# Patient Record
Sex: MALE | Race: WHITE | Employment: OTHER | URBAN - METROPOLITAN AREA
[De-identification: names, ages, dates, MRNs, and addresses within clinical notes are randomized per-mention and may not be internally consistent; named-entity substitution may affect disease eponyms.]

---

## 2019-09-24 ENCOUNTER — TRANSFERRED RECORDS (OUTPATIENT)
Dept: FAMILY MEDICINE | Facility: CLINIC | Age: 69
End: 2019-09-24

## 2019-09-24 LAB — PSA SERPL-MCNC: 3.05 NG/ML (ref 0.13–4)

## 2019-09-27 ENCOUNTER — HEALTH MAINTENANCE LETTER (OUTPATIENT)
Age: 69
End: 2019-09-27

## 2020-03-15 ENCOUNTER — HEALTH MAINTENANCE LETTER (OUTPATIENT)
Age: 70
End: 2020-03-15

## 2020-10-14 ENCOUNTER — OFFICE VISIT (OUTPATIENT)
Dept: FAMILY MEDICINE | Facility: CLINIC | Age: 70
End: 2020-10-14

## 2020-10-14 VITALS
RESPIRATION RATE: 20 BRPM | BODY MASS INDEX: 29.06 KG/M2 | DIASTOLIC BLOOD PRESSURE: 82 MMHG | HEART RATE: 60 BPM | WEIGHT: 203 LBS | HEIGHT: 70 IN | SYSTOLIC BLOOD PRESSURE: 138 MMHG | TEMPERATURE: 97.3 F

## 2020-10-14 DIAGNOSIS — K21.9 GASTROESOPHAGEAL REFLUX DISEASE WITHOUT ESOPHAGITIS: ICD-10-CM

## 2020-10-14 DIAGNOSIS — J45.40 MODERATE PERSISTENT ASTHMA, UNSPECIFIED WHETHER COMPLICATED: Primary | ICD-10-CM

## 2020-10-14 PROCEDURE — 99213 OFFICE O/P EST LOW 20 MIN: CPT | Performed by: FAMILY MEDICINE

## 2020-10-14 RX ORDER — SILDENAFIL 100 MG/1
100 TABLET, FILM COATED ORAL DAILY PRN
COMMUNITY
Start: 2020-10-14 | End: 2020-12-28

## 2020-10-14 RX ORDER — LORATADINE 10 MG/1
10 TABLET ORAL DAILY
COMMUNITY
Start: 2020-10-14

## 2020-10-14 RX ORDER — ALBUTEROL SULFATE 90 UG/1
1-2 AEROSOL, METERED RESPIRATORY (INHALATION) EVERY 6 HOURS PRN
COMMUNITY
Start: 2020-10-14 | End: 2022-03-18

## 2020-10-14 ASSESSMENT — ASTHMA QUESTIONNAIRES
ACUTE_EXACERBATION_TODAY: NO
QUESTION_1 LAST FOUR WEEKS HOW MUCH OF THE TIME DID YOUR ASTHMA KEEP YOU FROM GETTING AS MUCH DONE AT WORK, SCHOOL OR AT HOME: NONE OF THE TIME
ACT_TOTALSCORE: 23
QUESTION_4 LAST FOUR WEEKS HOW OFTEN HAVE YOU USED YOUR RESCUE INHALER OR NEBULIZER MEDICATION (SUCH AS ALBUTEROL): ONCE A WEEK OR LESS
QUESTION_2 LAST FOUR WEEKS HOW OFTEN HAVE YOU HAD SHORTNESS OF BREATH: NOT AT ALL
QUESTION_5 LAST FOUR WEEKS HOW WOULD YOU RATE YOUR ASTHMA CONTROL: WELL CONTROLLED
QUESTION_3 LAST FOUR WEEKS HOW OFTEN DID YOUR ASTHMA SYMPTOMS (WHEEZING, COUGHING, SHORTNESS OF BREATH, CHEST TIGHTNESS OR PAIN) WAKE YOU UP AT NIGHT OR EARLIER THAN USUAL IN THE MORNING: NOT AT ALL

## 2020-10-14 ASSESSMENT — MIFFLIN-ST. JEOR: SCORE: 1687.05

## 2020-10-14 NOTE — PROGRESS NOTES
"Subjective     Charlie Chacko is a 70 year old male who presents to clinic today for the following health issues:    HPI         Asthma Follow-Up    Was ACT completed today?  No      Do you have a cough?  No    Are you experiencing any wheezing in your chest?  No    Do you have any shortness of breath?  No     How often are you using a short-acting (rescue) inhaler or nebulizer, such as Albuterol?  A few times a month    How many days per week do you miss taking your asthma controller medication?  0    Please describe any recent triggers for your asthma: None    Have you had any Emergency Room Visits, Urgent Care Visits, or Hospital Admissions since your last office visit?  No    Doing well on omeprazole for reflux    How many servings of fruits and vegetables do you eat daily?  2-3    On average, how many sweetened beverages do you drink each day (Examples: soda, juice, sweet tea, etc.  Do NOT count diet or artificially sweetened beverages)?   1    How many days per week do you exercise enough to make your heart beat faster? 5    How many minutes a day do you exercise enough to make your heart beat faster? 20 - 29    How many days per week do you miss taking your medication? 0        Review of Systems   Constitutional, HEENT, cardiovascular, pulmonary, gi and gu systems are negative, except as otherwise noted.      Objective    /82   Pulse 60   Temp 97.3  F (36.3  C)   Resp 20   Ht 1.778 m (5' 10\")   Wt 92.1 kg (203 lb)   BMI 29.13 kg/m    Body mass index is 29.13 kg/m .  Physical Exam   GENERAL: healthy, alert and no distress  EYES: Eyes grossly normal to inspection, PERRL and conjunctivae and sclerae normal  HENT: ear canals and TM's normal, nose and mouth without ulcers or lesions  NECK: no adenopathy, no asymmetry, masses, or scars and thyroid normal to palpation  RESP: lungs clear to auscultation - no rales, rhonchi or wheezes  CV: regular rate and rhythm, normal S1 S2, no S3 or S4, no murmur, " click or rub, no peripheral edema and peripheral pulses strong  ABDOMEN: soft, nontender, no hepatosplenomegaly, no masses and bowel sounds normal  MS: no gross musculoskeletal defects noted, no edema            (J45.40) Moderate persistent asthma, unspecified whether complicated  (primary encounter diagnosis)  Comment: doing very well  Plan: BREO ELLIPTA 100-25 MCG/INH inhaler,         DISCONTINUED: BREO ELLIPTA 100-25 MCG/INH         inhaler            (K21.9) Gastroesophageal reflux disease without esophagitis  Comment: well controlled  Plan: omeprazole (PRILOSEC) 20 MG DR capsule

## 2020-10-14 NOTE — NURSING NOTE
Charlie Chacko is here for a medication check and refill.    Questioned patient about current smoking habits.  Pt. has never smoked.  PULSE regular  My Chart: active  CLASSIFICATION OF OVERWEIGHT AND OBESITY BY BMI                        Obesity Class           BMI(kg/m2)  Underweight                                    < 18.5  Normal                                         18.5-24.9  Overweight                                     25.0-29.9  OBESITY                     I                  30.0-34.9                             II                 35.0-39.9  EXTREME OBESITY             III                >40                            Patient's  BMI Body mass index is 29.13 kg/m .  http://hin.nhlbi.nih.gov/menuplanner/menu.cgi  Pre-visit planning  Immunizations - up to date  Colonoscopy - need record, 2017 in Care Everywhere  Mammogram -   Asthma -   PHQ9 -    NYASIA-7 -

## 2020-10-14 NOTE — LETTER
[facility]  [address]  [address]  [facility] [address]  [phone]      October 14, 2020      Charlie Chacko  [id number] [place] [facility] [id number]      EMERGENCY CARE PLAN  Presenting Problem Treatment Plan   Questions or concerns during clinic hours I will call the clinic directly:    [facility]  [address] , [address]  [place] [address]  [phone]   Questions or concerns outside clinic hours  I will call the 24 hour line at [phone]   Patient needs to schedule an appointment  I will call the  scheduling line at [phone]   Same day treatment   I will call the clinic first, then  urgent care and/or  express care if needed   Clinic Care Coordinators Xochilt Drake RN:  [phone]  [facility] Clinical Support Staff: [phone]    Crisis Services:  Behavioral or Mental Health [facility] ([facility])   [phone]   Emergency treatment--Immediately CALL 555

## 2020-10-15 ASSESSMENT — ASTHMA QUESTIONNAIRES: ACT_TOTALSCORE: 23

## 2020-11-09 ENCOUNTER — TRANSFERRED RECORDS (OUTPATIENT)
Dept: FAMILY MEDICINE | Facility: CLINIC | Age: 70
End: 2020-11-09

## 2020-12-28 DIAGNOSIS — N52.9 ERECTILE DYSFUNCTION, UNSPECIFIED ERECTILE DYSFUNCTION TYPE: Primary | ICD-10-CM

## 2020-12-28 RX ORDER — SILDENAFIL 100 MG/1
100 TABLET, FILM COATED ORAL DAILY PRN
Qty: 30 TABLET | Refills: 0 | Status: SHIPPED | OUTPATIENT
Start: 2020-12-28 | End: 2021-06-28

## 2020-12-28 NOTE — TELEPHONE ENCOUNTER
Charlie is requesting a refill of sildenafil.  He is requesting a quantity of 30 rather than qty of 10 because of cost. Pt states he takes 2 per week.  Pt last seen in-clinic on 10/14/2020.  Routing to Dr. Haney.      Pending Prescriptions:                       Disp   Refills    sildenafil (VIAGRA) 100 MG tablet         30 tab*0            Sig: Take 1 tablet (100 mg) by mouth daily as needed           (For ED)

## 2021-01-09 ENCOUNTER — HEALTH MAINTENANCE LETTER (OUTPATIENT)
Age: 71
End: 2021-01-09

## 2021-01-25 ENCOUNTER — TRANSFERRED RECORDS (OUTPATIENT)
Dept: FAMILY MEDICINE | Facility: CLINIC | Age: 71
End: 2021-01-25

## 2021-05-08 ENCOUNTER — HEALTH MAINTENANCE LETTER (OUTPATIENT)
Age: 71
End: 2021-05-08

## 2021-06-28 ENCOUNTER — OFFICE VISIT (OUTPATIENT)
Dept: FAMILY MEDICINE | Facility: CLINIC | Age: 71
End: 2021-06-28

## 2021-06-28 VITALS
BODY MASS INDEX: 28.49 KG/M2 | OXYGEN SATURATION: 94 % | DIASTOLIC BLOOD PRESSURE: 76 MMHG | HEIGHT: 70 IN | HEART RATE: 87 BPM | WEIGHT: 199 LBS | TEMPERATURE: 98 F | SYSTOLIC BLOOD PRESSURE: 112 MMHG

## 2021-06-28 DIAGNOSIS — Z12.12 SCREENING FOR COLORECTAL CANCER: ICD-10-CM

## 2021-06-28 DIAGNOSIS — F41.9 ANXIETY: ICD-10-CM

## 2021-06-28 DIAGNOSIS — Z12.11 SCREENING FOR COLORECTAL CANCER: ICD-10-CM

## 2021-06-28 DIAGNOSIS — Z00.01 ENCOUNTER FOR GENERAL ADULT MEDICAL EXAMINATION WITH ABNORMAL FINDINGS: Primary | ICD-10-CM

## 2021-06-28 DIAGNOSIS — N52.9 ERECTILE DYSFUNCTION, UNSPECIFIED ERECTILE DYSFUNCTION TYPE: ICD-10-CM

## 2021-06-28 DIAGNOSIS — F41.1 GENERALIZED ANXIETY DISORDER: ICD-10-CM

## 2021-06-28 LAB
BUN SERPL-MCNC: 7.5 MG/DL (ref 7–25)
BUN/CREATININE RATIO: 12.1 (ref 6–22)
CALCIUM SERPL-MCNC: 9.8 MG/DL (ref 8.6–10.3)
CHLORIDE SERPLBLD-SCNC: 101.2 MMOL/L (ref 98–110)
CHOLEST SERPL-MCNC: 213 MG/DL (ref 0–199)
CHOLEST/HDLC SERPL: 3 {RATIO} (ref 0–5)
CO2 SERPL-SCNC: 28 MMOL/L (ref 20–32)
CREAT SERPL-MCNC: 1.32 MG/DL (ref 0.6–1.3)
GFR SERPL CREATININE-BSD FRML MDRD: 54 ML/MIN/1.73M2
GLUCOSE SERPL-MCNC: 106 MG/DL (ref 60–99)
HDLC SERPL-MCNC: 66 MG/DL (ref 40–150)
LDLC SERPL CALC-MCNC: 122 MG/DL (ref 0–130)
POTASSIUM SERPL-SCNC: 4.9 MMOL/L (ref 3.5–5.3)
SODIUM SERPL-SCNC: 140.4 MMOL/L (ref 135–146)
TRIGL SERPL-MCNC: 124 MG/DL (ref 0–149)

## 2021-06-28 PROCEDURE — 99213 OFFICE O/P EST LOW 20 MIN: CPT | Mod: 25 | Performed by: STUDENT IN AN ORGANIZED HEALTH CARE EDUCATION/TRAINING PROGRAM

## 2021-06-28 PROCEDURE — G0402 INITIAL PREVENTIVE EXAM: HCPCS | Performed by: STUDENT IN AN ORGANIZED HEALTH CARE EDUCATION/TRAINING PROGRAM

## 2021-06-28 PROCEDURE — 84443 ASSAY THYROID STIM HORMONE: CPT | Mod: 90 | Performed by: STUDENT IN AN ORGANIZED HEALTH CARE EDUCATION/TRAINING PROGRAM

## 2021-06-28 PROCEDURE — 80048 BASIC METABOLIC PNL TOTAL CA: CPT | Performed by: STUDENT IN AN ORGANIZED HEALTH CARE EDUCATION/TRAINING PROGRAM

## 2021-06-28 PROCEDURE — 80061 LIPID PANEL: CPT | Performed by: STUDENT IN AN ORGANIZED HEALTH CARE EDUCATION/TRAINING PROGRAM

## 2021-06-28 PROCEDURE — 36415 COLL VENOUS BLD VENIPUNCTURE: CPT | Performed by: STUDENT IN AN ORGANIZED HEALTH CARE EDUCATION/TRAINING PROGRAM

## 2021-06-28 RX ORDER — ESCITALOPRAM OXALATE 10 MG/1
10 TABLET ORAL DAILY
Qty: 90 TABLET | Refills: 0 | Status: SHIPPED | OUTPATIENT
Start: 2021-06-28 | End: 2021-09-17

## 2021-06-28 RX ORDER — ALPRAZOLAM 0.25 MG
0.25 TABLET ORAL DAILY PRN
Qty: 15 TABLET | Refills: 0 | Status: SHIPPED | OUTPATIENT
Start: 2021-06-28

## 2021-06-28 RX ORDER — SILDENAFIL 100 MG/1
100 TABLET, FILM COATED ORAL DAILY PRN
Qty: 30 TABLET | Refills: 0 | Status: SHIPPED | OUTPATIENT
Start: 2021-06-28 | End: 2021-09-17

## 2021-06-28 SDOH — HEALTH STABILITY: MENTAL HEALTH: HOW MANY STANDARD DRINKS CONTAINING ALCOHOL DO YOU HAVE ON A TYPICAL DAY?: 1 OR 2

## 2021-06-28 SDOH — HEALTH STABILITY: MENTAL HEALTH: HOW OFTEN DO YOU HAVE A DRINK CONTAINING ALCOHOL?: 2-4 TIMES A MONTH

## 2021-06-28 SDOH — HEALTH STABILITY: MENTAL HEALTH: HOW OFTEN DO YOU HAVE 6 OR MORE DRINKS ON ONE OCCASION?: NOT ASKED

## 2021-06-28 ASSESSMENT — PATIENT HEALTH QUESTIONNAIRE - PHQ9
5. POOR APPETITE OR OVEREATING: MORE THAN HALF THE DAYS
SUM OF ALL RESPONSES TO PHQ QUESTIONS 1-9: 3

## 2021-06-28 ASSESSMENT — ANXIETY QUESTIONNAIRES
3. WORRYING TOO MUCH ABOUT DIFFERENT THINGS: MORE THAN HALF THE DAYS
6. BECOMING EASILY ANNOYED OR IRRITABLE: NOT AT ALL
5. BEING SO RESTLESS THAT IT IS HARD TO SIT STILL: NOT AT ALL
2. NOT BEING ABLE TO STOP OR CONTROL WORRYING: MORE THAN HALF THE DAYS
7. FEELING AFRAID AS IF SOMETHING AWFUL MIGHT HAPPEN: NOT AT ALL
GAD7 TOTAL SCORE: 8
IF YOU CHECKED OFF ANY PROBLEMS ON THIS QUESTIONNAIRE, HOW DIFFICULT HAVE THESE PROBLEMS MADE IT FOR YOU TO DO YOUR WORK, TAKE CARE OF THINGS AT HOME, OR GET ALONG WITH OTHER PEOPLE: SOMEWHAT DIFFICULT
1. FEELING NERVOUS, ANXIOUS, OR ON EDGE: MORE THAN HALF THE DAYS

## 2021-06-28 ASSESSMENT — MIFFLIN-ST. JEOR: SCORE: 1655.97

## 2021-06-28 NOTE — PROGRESS NOTES
SUBJECTIVE:   Charlie Chacko is a 71 year old male who presents for Preventive Visit.    Patient has been advised of split billing requirements and indicates understanding: Yes  Are you in the first 12 months of your Medicare Part B coverage?  No    Physical Health:    In general, how would you rate your overall physical health? good    If you drink alcohol do you typically have >3 drinks per day or >7 drinks per week? No    Do you have any problems taking medications regularly?  No    Do you have any side effects from medications? none, mild flushing with sildenafil    Needs assistance for the following daily activities: no assistance needed    Which of the following safety concerns are present in your home?  none identified     Hearing impairment: Yes, has hearing aids    Mental Health:    In general, how would you rate your overall mental or emotional health? fair  PHQ-2 Score:      Do you feel safe in your environment? Yes    Additional concerns to address?  YES    Do you have sleep apnea, excessive snoring or daytime drowsiness?: no    Anxiety Follow-Up    How are you doing with your anxiety since your last visit? Worsened over past 2 yrs. Stress more overwhelming. Sometimes difficulty sleeping.     Are you having other symptoms that might be associated with anxiety? Yes:  worsening ED despite viagra    Have you had a significant life event? No     Are you feeling depressed? Yes:  sometimes feels anhedonia    Do you have any concerns with your use of alcohol or other drugs? No    Has taken xanax very intermittently (30 tab rx has lasted for >10 yrs). Also did see psychologist back in the 80s, prefers pharmacologic options.     Social History     Tobacco Use     Smoking status: Never Smoker     Smokeless tobacco: Never Used   Substance Use Topics     Alcohol use: Yes     Alcohol/week: 0.8 standard drinks     Types: 1 Glasses of wine per week     Frequency: 2-4 times a month     Drinks per session: 1 or 2      Drug use: No     NYASIA-7 SCORE 6/28/2021   Total Score 8     PHQ 6/28/2021   PHQ-9 Total Score 3   Q9: Thoughts of better off dead/self-harm past 2 weeks Not at all     Last PHQ-9 6/28/2021   1.  Little interest or pleasure in doing things 1   2.  Feeling down, depressed, or hopeless 0   3.  Trouble falling or staying asleep, or sleeping too much 1   4.  Feeling tired or having little energy 1   5.  Poor appetite or overeating 0   6.  Feeling bad about yourself 0   7.  Trouble concentrating 0   8.  Moving slowly or restless 0   Q9: Thoughts of better off dead/self-harm past 2 weeks 0   PHQ-9 Total Score 3   Difficulty at work, home, or with people Not difficult at all     NYASIA-7  6/28/2021   1. Feeling nervous, anxious, or on edge 2   2. Not being able to stop or control worrying 2   3. Worrying too much about different things 2   4. Trouble relaxing 2   5. Being so restless that it is hard to sit still 0   6. Becoming easily annoyed or irritable 0   7. Feeling afraid, as if something awful might happen 0   NYASIA-7 Total Score 8   If you checked any problems, how difficult have they made it for you to do your work, take care of things at home, or get along with other people? Somewhat difficult         Reviewed and updated as needed this visit by clinical staff   Allergies  Meds              Reviewed and updated as needed this visit by Provider                Social History     Tobacco Use     Smoking status: Never Smoker     Smokeless tobacco: Never Used   Substance Use Topics     Alcohol use: Yes     Alcohol/week: 0.8 standard drinks     Types: 1 Glasses of wine per week     Frequency: 2-4 times a month     Drinks per session: 1 or 2                           Current providers sharing in care for this patient include:   Patient Care Team:  Vivien Nguyen MD as PCP - General (Family Medicine)  Aramis Haney MD as Assigned PCP    The following health maintenance items are reviewed in Epic and correct as of  today:  Health Maintenance   Topic Date Due     HEPATITIS C SCREENING  Never done     ASTHMA ACTION PLAN  09/12/2009     LIPID  02/08/2015     AORTIC ANEURYSM SCREENING (SYSTEM ASSIGNED)  Never done     COLORECTAL CANCER SCREENING  07/27/2020     ASTHMA CONTROL TEST  04/14/2021     DTAP/TDAP/TD IMMUNIZATION (3 - Td) 09/28/2021 (Originally 3/3/2020)     FALL RISK ASSESSMENT  10/14/2021     MEDICARE ANNUAL WELLNESS VISIT  06/28/2022     PSA  09/24/2022     ADVANCE CARE PLANNING  06/28/2026     PHQ-2  Completed     INFLUENZA VACCINE  Completed     Pneumococcal Vaccine: 65+ Years  Completed     ZOSTER IMMUNIZATION  Completed     COVID-19 Vaccine  Completed     IPV IMMUNIZATION  Aged Out     MENINGITIS IMMUNIZATION  Aged Out     HEPATITIS B IMMUNIZATION  Aged Out     Current Outpatient Medications   Medication Sig Dispense Refill     albuterol (PROAIR HFA/PROVENTIL HFA/VENTOLIN HFA) 108 (90 Base) MCG/ACT inhaler Inhale 1-2 puffs into the lungs every 6 hours as needed for shortness of breath / dyspnea or wheezing       ALPRAZolam (XANAX) 0.25 MG tablet Take 1 tablet (0.25 mg) by mouth daily as needed for anxiety 15 tablet 0     aspirin 81 MG tablet Take 1 tablet by mouth daily.       BREO ELLIPTA 100-25 MCG/INH inhaler Inhale 1 puff into the lungs every other day 1 Inhaler 6     CALCIUM 500 MG OR TABS 1 daily       EPI E-Z PEN ELLA 1:1000  IJ 1 TIME ONLY 1 prn     escitalopram (LEXAPRO) 10 MG tablet Take 1 tablet (10 mg) by mouth daily 90 tablet 0     loratadine (CLARITIN) 10 MG tablet Take 1 tablet (10 mg) by mouth daily       Multiple Vitamins-Minerals (CENTRUM SILVER) per tablet Take 1 tablet by mouth daily.       omeprazole (PRILOSEC) 20 MG DR capsule Take 1 capsule (20 mg) by mouth daily 90 capsule 3     sildenafil (VIAGRA) 100 MG tablet Take 1 tablet (100 mg) by mouth daily as needed (For ED) 30 tablet 0     BENADRYL 25 MG OR TABS 1 TABLET EVERY 4 TO 6 HOURS AS NEEDED 0 0     FISH OIL 1200 MG OR CAPS 1 daily 0 0  "    ROS:  Constitutional, HEENT, cardiovascular, pulmonary, GI, , musculoskeletal, neuro, skin, endocrine and psych systems are negative, except as otherwise noted.    OBJECTIVE:   /76 (BP Location: Left arm, Patient Position: Sitting, Cuff Size: Adult Large)   Pulse 87   Temp 98  F (36.7  C) (Oral)   Ht 1.765 m (5' 9.5\")   Wt 90.3 kg (199 lb)   SpO2 94%   BMI 28.97 kg/m   Estimated body mass index is 28.97 kg/m  as calculated from the following:    Height as of this encounter: 1.765 m (5' 9.5\").    Weight as of this encounter: 90.3 kg (199 lb).  EXAM:   GENERAL: healthy, alert and no distress  EYES: Eyes grossly normal to inspection, PERRL and conjunctivae and sclerae normal  HENT: ear canals and TM's normal, nose and mouth without ulcers or lesions  NECK: no adenopathy, no asymmetry, masses, or scars and thyroid normal to palpation  RESP: lungs clear to auscultation - no rales, rhonchi or wheezes  CV: regular rate and rhythm, normal S1 S2, no murmur, click or rub, no peripheral edema and peripheral pulses strong  ABDOMEN: soft, nontender, no hepatosplenomegaly, no masses and bowel sounds normal  MS: no gross musculoskeletal defects noted, no edema  SKIN: no suspicious lesions or rashes  NEURO: Normal strength and tone, mentation intact and speech normal  PSYCH: mentation appears normal, affect normal/bright    Diagnostic Test Results:  Labs reviewed in Epic    ASSESSMENT / PLAN:       ICD-10-CM    1. Encounter for general adult medical examination with abnormal findings  Z00.01    2. Anxiety  F41.9 ALPRAZolam (XANAX) 0.25 MG tablet     TSH WITH FREE T4 REFLEX ([facility])   3. Erectile dysfunction, unspecified erectile dysfunction type  N52.9 sildenafil (VIAGRA) 100 MG tablet     Lipid Panel (BFP)     Basic Metabolic Panel (BFP)   4. Screening for colorectal cancer  Z12.11 GASTROENTEROLOGY ADULT REF PROCEDURE ONLY    Z12.12    5. Generalized anxiety disorder  F41.1 escitalopram (LEXAPRO) 10 MG tablet " "      Patient has been advised of split billing requirements and indicates understanding: Yes    COUNSELING:  Reviewed preventive health counseling, as reflected in patient instructions       Regular exercise       Healthy diet/nutrition       Vision screening       Hearing screening       Dental care       Colon cancer screening       Prostate cancer screening    Estimated body mass index is 28.97 kg/m  as calculated from the following:    Height as of this encounter: 1.765 m (5' 9.5\").    Weight as of this encounter: 90.3 kg (199 lb).        He reports that he has never smoked. He has never used smokeless tobacco.    Appropriate preventive services were discussed with this patient, including applicable screening as appropriate for cardiovascular disease, diabetes, osteopenia/osteoporosis, and glaucoma.  As appropriate for age/gender, discussed screening for colorectal cancer, prostate cancer, breast cancer, and cervical cancer. Checklist reviewing preventive services available has been given to the patient.    Additional issues addressed today:  1.  Anxiety: Longstanding history of anxiety symptoms worsening over the past 2 years, consistent with generalized anxiety disorder.  Discussed the nature of this condition and available treatment options. Will check TSH with labs today.  Patient elects to begin SSRI, follow-up in 4-6 weeks or sooner as needed.  Small refill of alprazolam sent, but discussed risks and reasons it is not a long-term management option.  2.  Erectile dysfunction: Viagra seemingly less effective recently, discussed alternatives but agreed to continue monitoring symptoms while we work to improve anxiety.  Suspect strong psychologic component.    Patient in agreement this plan, all questions answered.      Masood Liriano MD  [facility]  "

## 2021-06-28 NOTE — PATIENT INSTRUCTIONS
Blood work here today     Start Lexapro 1/2 pill daily for one week, then increase to 1 pill daily    Schedule colonoscopy at your convience    Follow up with me in 4-6 weeks to reassess anxiety, sooner if needed      Patient Education   Personalized Prevention Plan  You are due for the preventive services outlined below.  Your care team is available to assist you in scheduling these services.  If you have already completed any of these items, please share that information with your care team to update in your medical record.  Health Maintenance Due   Topic Date Due     Hepatitis C Screening  Never done     Asthma Action Plan - yearly  09/12/2009     Cholesterol Lab  02/08/2015     Annual Wellness Visit  02/21/2015     AORTIC ANEURYSM SCREENING (SYSTEM ASSIGNED)  Never done     Colorectal Cancer Screening  07/27/2020     PHQ-2  Never done     Asthma Control Test  04/14/2021

## 2021-06-28 NOTE — NURSING NOTE
Chief Complaint   Patient presents with     Physical     fasting     Pre-Visit Screening:  Immunizations: TDAP due today  Colonoscopy: Overdue - order placed  Mammogram: NA  Asthma Action Test/Plan: given today  PHQ9: given today  GAD7: given today  Questioned patient about current smoking habits Pt. never  OK to leave a detailed message on voice mail for today's visit yes, phone # [phone]

## 2021-06-29 LAB — TSH SERPL-ACNC: 1.54 MIU/L (ref 0.4–4.5)

## 2021-06-29 ASSESSMENT — ASTHMA QUESTIONNAIRES: ACT_TOTALSCORE: 22

## 2021-06-29 ASSESSMENT — ANXIETY QUESTIONNAIRES: GAD7 TOTAL SCORE: 8

## 2021-07-02 DIAGNOSIS — R79.89 ELEVATED SERUM CREATININE: Primary | ICD-10-CM

## 2021-08-30 DIAGNOSIS — K21.9 GASTROESOPHAGEAL REFLUX DISEASE WITHOUT ESOPHAGITIS: ICD-10-CM

## 2021-08-30 NOTE — TELEPHONE ENCOUNTER
Charlie Chacko is requesting a refill of:    Pending Prescriptions:                       Disp   Refills    omeprazole (PRILOSEC) 20 MG DR capsule    90 cap*3            Sig: Take 1 capsule (20 mg) by mouth daily    Please close encounter if RX was sent. Thanks, Emerald

## 2021-09-17 ENCOUNTER — OFFICE VISIT (OUTPATIENT)
Dept: FAMILY MEDICINE | Facility: CLINIC | Age: 71
End: 2021-09-17

## 2021-09-17 VITALS
SYSTOLIC BLOOD PRESSURE: 124 MMHG | BODY MASS INDEX: 29.26 KG/M2 | OXYGEN SATURATION: 94 % | DIASTOLIC BLOOD PRESSURE: 80 MMHG | WEIGHT: 201 LBS | RESPIRATION RATE: 22 BRPM | HEART RATE: 66 BPM

## 2021-09-17 DIAGNOSIS — R79.89 ELEVATED SERUM CREATININE: ICD-10-CM

## 2021-09-17 DIAGNOSIS — F41.1 GENERALIZED ANXIETY DISORDER: Primary | ICD-10-CM

## 2021-09-17 DIAGNOSIS — N52.9 ERECTILE DYSFUNCTION, UNSPECIFIED ERECTILE DYSFUNCTION TYPE: ICD-10-CM

## 2021-09-17 LAB
BUN SERPL-MCNC: 17 MG/DL (ref 7–25)
BUN/CREATININE RATIO: 13.5 (ref 6–22)
CALCIUM SERPL-MCNC: 9.9 MG/DL (ref 8.6–10.3)
CHLORIDE SERPLBLD-SCNC: 103.4 MMOL/L (ref 98–110)
CO2 SERPL-SCNC: 28.5 MMOL/L (ref 20–32)
CREAT SERPL-MCNC: 1.26 MG/DL (ref 0.6–1.3)
GLUCOSE SERPL-MCNC: 103 MG/DL (ref 60–99)
POTASSIUM SERPL-SCNC: 4.93 MMOL/L (ref 3.5–5.3)
SODIUM SERPL-SCNC: 139.6 MMOL/L (ref 135–146)

## 2021-09-17 PROCEDURE — 99214 OFFICE O/P EST MOD 30 MIN: CPT | Performed by: STUDENT IN AN ORGANIZED HEALTH CARE EDUCATION/TRAINING PROGRAM

## 2021-09-17 PROCEDURE — 80048 BASIC METABOLIC PNL TOTAL CA: CPT | Performed by: STUDENT IN AN ORGANIZED HEALTH CARE EDUCATION/TRAINING PROGRAM

## 2021-09-17 PROCEDURE — 36415 COLL VENOUS BLD VENIPUNCTURE: CPT | Performed by: STUDENT IN AN ORGANIZED HEALTH CARE EDUCATION/TRAINING PROGRAM

## 2021-09-17 RX ORDER — SILDENAFIL 100 MG/1
100 TABLET, FILM COATED ORAL DAILY PRN
Qty: 90 TABLET | Refills: 0 | Status: SHIPPED | OUTPATIENT
Start: 2021-09-17 | End: 2024-08-16

## 2021-09-17 RX ORDER — ESCITALOPRAM OXALATE 10 MG/1
10 TABLET ORAL DAILY
Qty: 90 TABLET | Refills: 1 | Status: SHIPPED | OUTPATIENT
Start: 2021-09-17 | End: 2022-03-18

## 2021-09-17 NOTE — PATIENT INSTRUCTIONS
Blood work here today    Continue current medications    Colonoscopy at your convenience    Flu shot and tetanus at your pharmacy     Follow-up with me in approx 6 months, sooner if needed

## 2021-09-17 NOTE — PROGRESS NOTES
Assessment & Plan     1. Generalized anxiety disorder  Much improved, continue current dose. Refill sent. Follow-up 6 months, sooner prn.   - escitalopram (LEXAPRO) 10 MG tablet; Take 1 tablet (10 mg) by mouth daily  Dispense: 90 tablet; Refill: 1    2. Erectile dysfunction, unspecified erectile dysfunction type  Improved with anxiety treatment, no side effects or CV concerns, cont viagra prn.  - sildenafil (VIAGRA) 100 MG tablet; Take 1 tablet (100 mg) by mouth daily as needed (For ED)  Dispense: 90 tablet; Refill: 0    3. Elevated serum creatinine  Recheck, suspect prev slight elev was dehydration.   - Basic Metabolic Panel (BFP)  - VENOUS COLLECTION       Masood Liriano MD, [facility]       Subjective     Charlie Chacko is a 71 year old male who presents to clinic today for the following health issues:    HPI   Anxiety Follow-Up    How are you doing with your anxiety since your last visit? Improved significantly     Are you having other symptoms that might be associated with anxiety? No    Have you had a significant life event? No Stressors much less    Are you feeling depressed? No    Do you have any concerns with your use of alcohol or other drugs? No    PHQ9 and GAD7 both 0 today    ED follow-up. Much improved with less anxiety/stress as above. Needs viagra refilled.     Social History     Tobacco Use     Smoking status: Never Smoker     Smokeless tobacco: Never Used   Substance Use Topics     Alcohol use: Yes     Alcohol/week: 0.8 standard drinks     Types: 1 Glasses of wine per week     Drug use: No             Objective    /80 (BP Location: Right arm, Patient Position: Sitting, Cuff Size: Adult Regular)   Pulse 66   Resp 22   Wt 91.2 kg (201 lb)   SpO2 94%   BMI 29.26 kg/m    Body mass index is 29.26 kg/m .  Physical Exam   Alert, NAD  NC/AT  Sclerae anicteric  Resp nonlabored  Skin warm and dry  No focal neuro deficits. Speech intact. Normal gait.  Appropriate  affect

## 2021-09-17 NOTE — NURSING NOTE
Chief Complaint   Patient presents with     Recheck Medication     fasting, no concerns     Pre-Visit Screening:  Immunizations: due for high dose flu, TDAP  Colonoscopy:  Mammogram:  Asthma Action Test/Plan:  PHQ9: done today  GAD7: done today  Questioned patient about current smoking habits Pt.  OK to leave a detailed message on voice mail for today's visit YES, phone # [phone]

## 2021-10-23 ENCOUNTER — HEALTH MAINTENANCE LETTER (OUTPATIENT)
Age: 71
End: 2021-10-23

## 2021-11-29 DIAGNOSIS — J45.40 MODERATE PERSISTENT ASTHMA, UNSPECIFIED WHETHER COMPLICATED: ICD-10-CM

## 2021-12-10 ENCOUNTER — IMMUNIZATION (OUTPATIENT)
Dept: FAMILY MEDICINE | Facility: CLINIC | Age: 71
End: 2021-12-10
Payer: COMMERCIAL

## 2021-12-10 DIAGNOSIS — Z23 NEED FOR PROPHYLACTIC VACCINATION AND INOCULATION AGAINST INFLUENZA: Primary | ICD-10-CM

## 2021-12-10 PROCEDURE — G0008 ADMIN INFLUENZA VIRUS VAC: HCPCS

## 2021-12-10 PROCEDURE — 90662 IIV NO PRSV INCREASED AG IM: CPT

## 2022-03-08 DIAGNOSIS — F41.1 GENERALIZED ANXIETY DISORDER: ICD-10-CM

## 2022-03-08 RX ORDER — ESCITALOPRAM OXALATE 10 MG/1
TABLET ORAL
Qty: 90 TABLET | Refills: 1 | COMMUNITY
Start: 2022-03-08

## 2022-03-08 NOTE — TELEPHONE ENCOUNTER
Charlie Chacko is requesting a refill of:    Refused Prescriptions:                       Disp   Refills    escitalopram (LEXAPRO) 10 MG tablet [Pharm*90 tab*1        Sig: TAKE 1 TABLET BY MOUTH EVERY DAY  Refused By: ANDREINA MELENDEZ  Reason for Refusal: Patient needs appointment    Pt last seen 09/17/21 advised to return in 6 months, pt due for non-fasting OV. Sent [employer] message

## 2022-03-18 ENCOUNTER — OFFICE VISIT (OUTPATIENT)
Dept: FAMILY MEDICINE | Facility: CLINIC | Age: 72
End: 2022-03-18

## 2022-03-18 VITALS
WEIGHT: 207 LBS | BODY MASS INDEX: 29.63 KG/M2 | DIASTOLIC BLOOD PRESSURE: 80 MMHG | OXYGEN SATURATION: 95 % | HEIGHT: 70 IN | SYSTOLIC BLOOD PRESSURE: 140 MMHG | HEART RATE: 64 BPM | TEMPERATURE: 97.9 F | RESPIRATION RATE: 22 BRPM

## 2022-03-18 DIAGNOSIS — F41.1 GENERALIZED ANXIETY DISORDER: ICD-10-CM

## 2022-03-18 DIAGNOSIS — J45.40 MODERATE PERSISTENT ASTHMA, UNSPECIFIED WHETHER COMPLICATED: ICD-10-CM

## 2022-03-18 DIAGNOSIS — Z12.11 SPECIAL SCREENING FOR MALIGNANT NEOPLASMS, COLON: Primary | ICD-10-CM

## 2022-03-18 PROCEDURE — 99213 OFFICE O/P EST LOW 20 MIN: CPT | Performed by: STUDENT IN AN ORGANIZED HEALTH CARE EDUCATION/TRAINING PROGRAM

## 2022-03-18 RX ORDER — ESCITALOPRAM OXALATE 10 MG/1
10 TABLET ORAL DAILY
Qty: 90 TABLET | Refills: 1 | Status: SHIPPED | OUTPATIENT
Start: 2022-03-18 | End: 2022-09-26

## 2022-03-18 RX ORDER — ALBUTEROL SULFATE 90 UG/1
1-2 AEROSOL, METERED RESPIRATORY (INHALATION) EVERY 4 HOURS PRN
Qty: 18 G | Refills: 3 | Status: SHIPPED | OUTPATIENT
Start: 2022-03-18 | End: 2023-05-16

## 2022-03-18 ASSESSMENT — ANXIETY QUESTIONNAIRES
1. FEELING NERVOUS, ANXIOUS, OR ON EDGE: NOT AT ALL
5. BEING SO RESTLESS THAT IT IS HARD TO SIT STILL: NOT AT ALL
7. FEELING AFRAID AS IF SOMETHING AWFUL MIGHT HAPPEN: NOT AT ALL
2. NOT BEING ABLE TO STOP OR CONTROL WORRYING: NOT AT ALL
6. BECOMING EASILY ANNOYED OR IRRITABLE: NOT AT ALL
GAD7 TOTAL SCORE: 0
3. WORRYING TOO MUCH ABOUT DIFFERENT THINGS: NOT AT ALL
IF YOU CHECKED OFF ANY PROBLEMS ON THIS QUESTIONNAIRE, HOW DIFFICULT HAVE THESE PROBLEMS MADE IT FOR YOU TO DO YOUR WORK, TAKE CARE OF THINGS AT HOME, OR GET ALONG WITH OTHER PEOPLE: NOT DIFFICULT AT ALL

## 2022-03-18 ASSESSMENT — PATIENT HEALTH QUESTIONNAIRE - PHQ9
5. POOR APPETITE OR OVEREATING: NOT AT ALL
SUM OF ALL RESPONSES TO PHQ QUESTIONS 1-9: 1

## 2022-03-18 ASSESSMENT — ASTHMA QUESTIONNAIRES: ACT_TOTALSCORE: 25

## 2022-03-18 NOTE — PATIENT INSTRUCTIONS
Continue current medications    schdule colonoscopy  [facility]  [address]  [address]  [place] [address]  [phone] - appt line    Follow-up with me in 6 months, sooner if needed

## 2022-03-18 NOTE — NURSING NOTE
Chief Complaint   Patient presents with     Recheck Medication     non fasting medication check and review for Lexapro     Pre-visit Screening:  Immunizations:  up to date  Colonoscopy:  is due and ordered today  Mammogram: NA  Asthma Action Test/Plan:  ACT given today   PHQ9: given today    GAD7: given today   Questioned patient about current smoking habits Pt. has never smoked.  Ok to leave detailed message on voice mail for today's visit only Yes, phone # [phone]

## 2022-03-18 NOTE — PROGRESS NOTES
Assessment & Plan     1. Special screening for malignant neoplasms, colon  Screening due  - Adult Gastro Ref - Procedure Only; Future    2. Generalized anxiety disorder  Doing well, continue current meds, discussed additional resources, follow-up 6 mos or sooner prn  - escitalopram (LEXAPRO) 10 MG tablet; Take 1 tablet (10 mg) by mouth daily  Dispense: 90 tablet; Refill: 1    3. Moderate persistent asthma, unspecified whether complicated  Stable, continue current meds  - BREO ELLIPTA 100-25 MCG/INH inhaler; Inhale 1 puff into the lungs daily  Dispense: 60 each; Refill: 3  - albuterol (PROAIR HFA/PROVENTIL HFA/VENTOLIN HFA) 108 (90 Base) MCG/ACT inhaler; Inhale 1-2 puffs into the lungs every 4 hours as needed for shortness of breath / dyspnea or wheezing  Dispense: 18 g; Refill: 3       Patient Instructions   Continue current medications    schdule colonoscopy  [facility]  [address]  [address]  [place] [address]  [phone] - appt line    Follow-up with me in 6 months, sooner if needed      Masood Liriano MD, [facility]       Subjective     Charlie Chacko is a 72 year old male who presents to clinic today for the following health issues:    HPI   Chief Complaint   Patient presents with     Recheck Medication     non fasting medication check and review for Lexapro      Anxiety Follow-Up    How are you doing with your anxiety since your last visit? Improved     Are you having other symptoms that might be associated with anxiety? No    Have you had a significant life event? No     Are you feeling depressed? No    Do you have any concerns with your use of alcohol or other drugs? No    Social History     Tobacco Use     Smoking status: Never Smoker     Smokeless tobacco: Never Used   Substance Use Topics     Alcohol use: Yes     Alcohol/week: 0.8 standard drinks     Types: 1 Glasses of wine per week     Drug use: No     NYASIA-7 SCORE 6/28/2021 3/18/2022   Total Score 8 0  "    PHQ 6/28/2021 3/18/2022   PHQ-9 Total Score 3 1   Q9: Thoughts of better off dead/self-harm past 2 weeks Not at all Not at all       Asthma Follow-Up    Was ACT completed today?    Yes    ACT Total Scores 3/18/2022   ACT TOTAL SCORE (Goal Greater than or Equal to 20) 25   In the past 12 months, how many times did you visit the emergency room for your asthma without being admitted to the hospital? 0   In the past 12 months, how many times were you hospitalized overnight because of your asthma? 0          How many days per week do you miss taking your asthma controller medication?  Doesn't need every day    Please describe any recent triggers for your asthma: None    Have you had any Emergency Room Visits, Urgent Care Visits, or Hospital Admissions since your last office visit?  No          Objective    BP (!) 140/80 (BP Location: Right arm, Patient Position: Sitting, Cuff Size: Adult Large)   Pulse 64   Temp 97.9  F (36.6  C) (Temporal)   Resp 22   Ht 1.778 m (5' 10\")   Wt 93.9 kg (207 lb)   SpO2 95%   BMI 29.70 kg/m    Body mass index is 29.7 kg/m .  Physical Exam   Alert, NAD  NC/AT  Sclerae anicteric  RRR  Resp nonlabored, ctab  Skin warm and dry  No focal neuro deficits. Speech intact. Normal gait.  Appropriate, bright affect          "

## 2022-03-19 ASSESSMENT — ANXIETY QUESTIONNAIRES: GAD7 TOTAL SCORE: 0

## 2022-05-24 ENCOUNTER — OFFICE VISIT (OUTPATIENT)
Dept: FAMILY MEDICINE | Facility: CLINIC | Age: 72
End: 2022-05-24

## 2022-05-24 VITALS
TEMPERATURE: 98.3 F | BODY MASS INDEX: 29.7 KG/M2 | SYSTOLIC BLOOD PRESSURE: 126 MMHG | OXYGEN SATURATION: 92 % | WEIGHT: 207 LBS | HEART RATE: 80 BPM | RESPIRATION RATE: 22 BRPM

## 2022-05-24 DIAGNOSIS — U07.1 INFECTION DUE TO 2019 NOVEL CORONAVIRUS: Primary | ICD-10-CM

## 2022-05-24 DIAGNOSIS — R09.81 SINUS CONGESTION: ICD-10-CM

## 2022-05-24 DIAGNOSIS — J45.40 MODERATE PERSISTENT ASTHMA, UNSPECIFIED WHETHER COMPLICATED: ICD-10-CM

## 2022-05-24 LAB — COVID-19: POSITIVE

## 2022-05-24 PROCEDURE — 87635 SARS-COV-2 COVID-19 AMP PRB: CPT | Performed by: STUDENT IN AN ORGANIZED HEALTH CARE EDUCATION/TRAINING PROGRAM

## 2022-05-24 PROCEDURE — 99214 OFFICE O/P EST MOD 30 MIN: CPT | Performed by: STUDENT IN AN ORGANIZED HEALTH CARE EDUCATION/TRAINING PROGRAM

## 2022-05-24 PROCEDURE — G2023 SPECIMEN COLLECT COVID-19: HCPCS | Performed by: STUDENT IN AN ORGANIZED HEALTH CARE EDUCATION/TRAINING PROGRAM

## 2022-05-24 NOTE — PROGRESS NOTES
Assessment & Plan       ICD-10-CM    1. Infection due to 2019 novel coronavirus  U07.1 nirmatrelvir and ritonavir (PAXLOVID) therapy pack   2. Sinus congestion  R09.81 COVID-19 (BFP)     nirmatrelvir and ritonavir (PAXLOVID) therapy pack   3. Moderate persistent asthma, unspecified whether complicated  J45.40 nirmatrelvir and ritonavir (PAXLOVID) therapy pack      Discussed r/b/a of paxlovid, pt elects to pursue. GFR borderline so will use reduced dosing. Aware not to take viagra or xanax while taking. Supportive cares and reasons to follow-up or seek emergent care reviewed.        30 minutes spent on the date of the encounter doing chart review, history and exam, documentation and further activities per the note    Masood Liriano MD, [facility]       Subjective     Charlie Chacko is a 72 year old male who presents to clinic today for the following health issues:    HPI   Chief Complaint   Patient presents with     Sinus Problem     Starting three days ago had some sinus pressure and congestion, still having congestion and did have a low grade fever        COVID-19 Symptom Review  How many days ago did these symptoms start? 3 d  Primarily sinus congestion   Are any of the following symptoms significant for you?    New or worsening difficulty breathing? No    Worsening cough? No    Fever or chills? Yes, I felt feverish or had chills.    Headache: no    Sore throat: YES    Chest pain: no    Diarrhea: no    Body aches? no    What treatments has patient tried? Using inhalers   Does patient live in a nursing home, group home, or shelter? no  Does patient have a way to get food/medications during quarantined? Yes, I have a friend or family member who can help me.        Current Outpatient Medications   Medication     albuterol (PROAIR HFA/PROVENTIL HFA/VENTOLIN HFA) 108 (90 Base) MCG/ACT inhaler     ALPRAZolam (XANAX) 0.25 MG tablet     aspirin 81 MG tablet     BENADRYL 25 MG OR TABS     BREO  ELLIPTA 100-25 MCG/INH inhaler     CALCIUM 500 MG OR TABS     EPI E-Z PEN ELLA 1:1000  IJ     escitalopram (LEXAPRO) 10 MG tablet     FISH OIL 1200 MG OR CAPS     loratadine (CLARITIN) 10 MG tablet     Multiple Vitamins-Minerals (CENTRUM SILVER) per tablet     nirmatrelvir and ritonavir (PAXLOVID) therapy pack     omeprazole (PRILOSEC) 20 MG DR capsule     sildenafil (VIAGRA) 100 MG tablet     No current facility-administered medications for this visit.           Objective    BP (!) 126/0 (BP Location: Right arm, Patient Position: Sitting, Cuff Size: Adult Large)   Pulse 80   Temp 98.3  F (36.8  C) (Oral)   Resp 22   Wt 93.9 kg (207 lb)   SpO2 92%   BMI 29.70 kg/m    Body mass index is 29.7 kg/m .  Physical Exam   Alert, NAD  NC/AT  Sclerae anicteric  Regular  Resp nonlabored, clear  Skin warm and dry  No focal neuro deficits. Speech intact. Normal gait.  Appropriate affect    Last Comprehensive Metabolic Panel:  Sodium   Date Value Ref Range Status   09/17/2021 139.6 135 - 146 mmol/L Final     Potassium   Date Value Ref Range Status   09/17/2021 4.93 3.5 - 5.3 mmol/L Final     Chloride   Date Value Ref Range Status   09/17/2021 103.4 98 - 110 mmol/L Final     Carbon Dioxide   Date Value Ref Range Status   09/17/2021 28.5 20 - 32 mmol/L Final     Anion Gap   Date Value Ref Range Status   02/08/2010 8 6 - 17 mmol/L Final     Glucose   Date Value Ref Range Status   09/17/2021 103 (A) 60 - 99 mg/dL Final     Urea Nitrogen   Date Value Ref Range Status   09/17/2021 17 7 - 25 mg/dL Final     BUN/Creatinine Ratio   Date Value Ref Range Status   09/17/2021 13.5 6 - 22 Final     Creatinine   Date Value Ref Range Status   09/17/2021 1.26 0.60 - 1.30 mg/dL Final     GFR Estimate   Date Value Ref Range Status   06/28/2021 54 (A) >60 ml/min/1.73m2 Final     Calcium   Date Value Ref Range Status   09/17/2021 9.9 8.6 - 10.3 mg/dL Final

## 2022-05-24 NOTE — NURSING NOTE
Chief Complaint   Patient presents with     Sinus Problem     Starting three days ago had some sinus pressure and congestion, still having congestion and did have a low grade fever     Pre-visit Screening:  Immunizations:  up to date  Colonoscopy:  Pt will get this scheduled soon  Mammogram: NA  Asthma Action Test/Plan:  NA  PHQ9:  NA  GAD7:  NA  Questioned patient about current smoking habits Pt. has never smoked.  Ok to leave detailed message on voice mail for today's visit only Yes, phone # [phone]

## 2022-05-24 NOTE — PATIENT INSTRUCTIONS
Call pharmacy before going to     [facility]: [phone] (M-F 8a-6p, Sat/Sun 8a-4p)    Dont take viagra or xanax while taking paxlovid      Instructions for Patients  {Choose where to send COVID19 patient based on nurse triage or clinical judgement of symptom severity & add additional information if desired (Optional):[id number]}    What are the symptoms of COVID-19?  Symptoms can include fever, cough, shortness of breath, chills, headache, muscle pain sore throat, fatigue, runny or stuffy nose, and loss of taste and smell. Other less common symptoms include nausea, vomiting, or diarrhea (watery stools).    Know when to call 911. Emergency warning signs include:    Trouble breathing or shortness of breath    Pain or pressure in the chest that doesn't go away    Feeling confused like you haven't felt before, or not being able to wake up    Bluish-colored lips or face    How can I take care of myself?  1. Get lots of rest. Drink extra fluids (unless a doctor has told you not to).  2. Take Tylenol (acetaminophen) for fever or pain. If you have liver or kidney problems, ask your family doctor if it's okay to take Tylenol   Adults:   650 mg (two 325 mg pills or tablets) every 4 to 6 hours, or...   1,000 mg (two 500 mg pills or tablets) every 8 hours as needed.  Note: Don't take more than 3,000 mg in one day. Acetaminophen is found in many medicines (both prescribed and over the counter). Read all labels to be sure you don't take too much.  For children, check the Tylenol bottle for the right dose. The dose is based on the child's age or weight.  3. Take over the counter medicines for your symptoms as needed. Talk to your pharmacist.  4. If you have other health problems (like cancer, heart failure, an organ transplant, or severe kidney disease): Call your specialty clinic if you don't feel better in the next 2 days.    These guidelines are for isolating and quarantining before  returning to work, school or .     For employers, schools and day cares: This is an official notice for this person s medical guidelines for returning in-person.     For health care sites: The CDC gives different isolation and quarantine guidelines for healthcare sites, please check with these sites before arriving.     How do I self-isolate?  You isolate when you have symptoms of COVID or a test shows you have COVID, even if you don t have symptoms.     If you DO have symptoms:  o Stay home and away from others  - For at least 5 days after your symptoms started, AND   - You are fever free for 24 hours (with no medicine that reduces fever), AND  - Your other symptoms are better.  o Wear a mask for 10 full days any time you are around others.    If you DON T have symptoms:  o Stay at home and away from others for at least 5 days after your positive test.  o Wear a mask for 10 full days any time you are around others.    How and when do I quarantine?  You quarantine when you may have been exposed to the virus and DON T have symptoms.     Stay home and away from others.     You must quarantine for 5 days after your last contact with a person who has COVID.  o Note: If you are fully vaccinated, you don t need to quarantine. You should still follow the steps below.     Wear a mask for 10 full days any time you re around others.    Get tested at least 5 days after you were exposed, even if you don t have symptoms.     If you start to have symptoms, isolate right away and get tested.    Where can I get more information?    [facility] COVID-19 Resource Hub: [email or web address]     CDC Quarantine & Isolation: https://www.cdc.gov/coronavirus/2019-ncov/your-health/quarantine-isolation.html     CDC - What to Do If You're Sick: https://www.cdc.gov/coronavirus/2019-ncov/if-you-are-sick/index.html    [facility] clinical trials (COVID-19 research studies):  [email or web address]    [place] Department of Health COVID-19 Public Hotline: 1-314.611.2924

## 2022-07-30 ENCOUNTER — HEALTH MAINTENANCE LETTER (OUTPATIENT)
Age: 72
End: 2022-07-30

## 2022-08-26 ENCOUNTER — OFFICE VISIT (OUTPATIENT)
Dept: FAMILY MEDICINE | Facility: CLINIC | Age: 72
End: 2022-08-26

## 2022-08-26 VITALS
DIASTOLIC BLOOD PRESSURE: 90 MMHG | HEART RATE: 63 BPM | OXYGEN SATURATION: 95 % | BODY MASS INDEX: 30.13 KG/M2 | WEIGHT: 210 LBS | RESPIRATION RATE: 22 BRPM | TEMPERATURE: 98 F | SYSTOLIC BLOOD PRESSURE: 126 MMHG

## 2022-08-26 DIAGNOSIS — R09.81 SINUS CONGESTION: ICD-10-CM

## 2022-08-26 DIAGNOSIS — J45.40 MODERATE PERSISTENT ASTHMA, UNSPECIFIED WHETHER COMPLICATED: ICD-10-CM

## 2022-08-26 DIAGNOSIS — R05.9 COUGH: Primary | ICD-10-CM

## 2022-08-26 LAB — COVID-19: NEGATIVE

## 2022-08-26 PROCEDURE — 99214 OFFICE O/P EST MOD 30 MIN: CPT | Performed by: STUDENT IN AN ORGANIZED HEALTH CARE EDUCATION/TRAINING PROGRAM

## 2022-08-26 PROCEDURE — 87635 SARS-COV-2 COVID-19 AMP PRB: CPT | Performed by: STUDENT IN AN ORGANIZED HEALTH CARE EDUCATION/TRAINING PROGRAM

## 2022-08-26 PROCEDURE — G2023 SPECIMEN COLLECT COVID-19: HCPCS | Performed by: STUDENT IN AN ORGANIZED HEALTH CARE EDUCATION/TRAINING PROGRAM

## 2022-08-26 RX ORDER — PREDNISONE 20 MG/1
40 TABLET ORAL
Qty: 10 TABLET | Refills: 0 | Status: SHIPPED | OUTPATIENT
Start: 2022-08-26 | End: 2022-08-31

## 2022-08-26 RX ORDER — AZITHROMYCIN 250 MG/1
TABLET, FILM COATED ORAL
Qty: 6 TABLET | Refills: 0 | Status: SHIPPED | OUTPATIENT
Start: 2022-08-26 | End: 2022-08-31

## 2022-08-26 ASSESSMENT — ASTHMA QUESTIONNAIRES: ACT_TOTALSCORE: 20

## 2022-08-26 NOTE — PATIENT INSTRUCTIONS
Likely viral infection, glad you're starting to improve    Covid test negative today, consider testing one more time this weekend    Can fill prescriptions if symptoms don't continue to improve by next week    Call or go in if anything significantly worsening over the weekend

## 2022-08-26 NOTE — PROGRESS NOTES
Assessment & Plan       ICD-10-CM    1. Cough  R05.9 COVID-19 (BFP)     azithromycin (ZITHROMAX) 250 MG tablet     predniSONE (DELTASONE) 20 MG tablet   2. Sinus congestion  R09.81    3. Moderate persistent asthma, unspecified whether complicated  J45.40       Covid negative, discussed still likely viral URI. Recommend continued sx mgmt, with prescriptions provided for patient to start 7-10 days from onset if sx don't continue to improve. Reasons to follow-up here or seek emergent care reviewed.    Patient Instructions   Likely viral infection, glad you're starting to improve    Covid test negative today, consider testing one more time this weekend    Can fill prescriptions if symptoms don't continue to improve by next week    Call or go in if anything significantly worsening over the weekend     Masood Liriano MD, [facility]       Subjective     Charlie Chacko is a 72 year old male who presents to clinic today for the following health issues:    HPI   Chief Complaint   Patient presents with     Sinus Problem     Sx began 4 days ago with sinus congestion, low grade fever, dry cough, and mild chest tightness and nocturnal wheezing noted by wife. No CP. Did an at home COVID test and it was negative. Has hx of seasonal allergies. Using mucinex and albuterol. No sick exposures, wife is well. Had covid in May.           Objective    BP (!) 126/90 (BP Location: Right arm, Patient Position: Sitting, Cuff Size: Adult Large)   Pulse 63   Temp 98  F (36.7  C) (Temporal)   Resp 22   Wt 95.3 kg (210 lb)   SpO2 95%   BMI 30.13 kg/m    Body mass index is 30.13 kg/m .  Physical Exam   Alert, NAD  NC/AT   Bilateral TMs wnl  OP clear  Mild sinus congestion bilat  Sclerae anicteric  RRR  Resp nonlabored, CTAB  Skin warm and dry  No focal neuro deficits. Speech intact. Normal gait.  Appropriate affect    Results for orders placed or performed in visit on 08/26/22   COVID-19 (BFP)     Status: None    Result Value Ref Range    COVID-19 Negative

## 2022-08-26 NOTE — NURSING NOTE
Chief Complaint   Patient presents with     Sinus Problem     Having some sinus congestion, feels that lungs are heavy, causing some asthma concerns, symptoms first started three to four days ago now, did an at home COVID test and it was negative, feels that it may be from allergies but wants to make sure, does have a slight cough due to drainage     Pre-visit Screening:  Immunizations:  up to date  Colonoscopy:  is due and to be scheduled by patient for later completion  Mammogram: NA  Asthma Action Test/Plan:  ACT given today   PHQ9:  NA  GAD7:  NA  Questioned patient about current smoking habits Pt. has never smoked.  Ok to leave detailed message on voice mail for today's visit only Yes, phone # [phone]

## 2022-08-31 DIAGNOSIS — K21.9 GASTROESOPHAGEAL REFLUX DISEASE WITHOUT ESOPHAGITIS: ICD-10-CM

## 2022-08-31 DIAGNOSIS — F41.1 GENERALIZED ANXIETY DISORDER: ICD-10-CM

## 2022-08-31 RX ORDER — ESCITALOPRAM OXALATE 10 MG/1
10 TABLET ORAL DAILY
Qty: 90 TABLET | Refills: 1 | COMMUNITY
Start: 2022-08-31

## 2022-08-31 NOTE — TELEPHONE ENCOUNTER
Charlie Chacko is requesting a refill of:    Refused Prescriptions:                       Disp   Refills    omeprazole (PRILOSEC) 20 MG DR capsule [Ph*90 cap*3        Sig: TAKE 1 CAPSULE BY MOUTH EVERY DAY  Refused By: ANDREINA MELENDEZ  Reason for Refusal: Patient needs appointment    escitalopram (LEXAPRO) 10 MG tablet [Pharm*90 tab*1        Sig: TAKE 1 TABLET (10 MG) BY MOUTH DAILY.  Refused By: ANDREINA MELENDEZ  Reason for Refusal: Patient needs appointment    Pt due for OV

## 2022-09-26 ENCOUNTER — OFFICE VISIT (OUTPATIENT)
Dept: FAMILY MEDICINE | Facility: CLINIC | Age: 72
End: 2022-09-26

## 2022-09-26 VITALS
BODY MASS INDEX: 30.35 KG/M2 | OXYGEN SATURATION: 96 % | HEIGHT: 70 IN | DIASTOLIC BLOOD PRESSURE: 82 MMHG | HEART RATE: 62 BPM | WEIGHT: 212 LBS | SYSTOLIC BLOOD PRESSURE: 134 MMHG | TEMPERATURE: 97.2 F

## 2022-09-26 DIAGNOSIS — F41.1 GENERALIZED ANXIETY DISORDER: ICD-10-CM

## 2022-09-26 DIAGNOSIS — K42.9 UMBILICAL HERNIA WITHOUT OBSTRUCTION AND WITHOUT GANGRENE: Primary | ICD-10-CM

## 2022-09-26 DIAGNOSIS — K21.9 GASTROESOPHAGEAL REFLUX DISEASE WITHOUT ESOPHAGITIS: ICD-10-CM

## 2022-09-26 PROCEDURE — 99214 OFFICE O/P EST MOD 30 MIN: CPT | Performed by: STUDENT IN AN ORGANIZED HEALTH CARE EDUCATION/TRAINING PROGRAM

## 2022-09-26 RX ORDER — ESCITALOPRAM OXALATE 10 MG/1
10 TABLET ORAL DAILY
Qty: 90 TABLET | Refills: 3 | Status: SHIPPED | OUTPATIENT
Start: 2022-09-26 | End: 2023-05-16

## 2022-09-26 ASSESSMENT — ANXIETY QUESTIONNAIRES
IF YOU CHECKED OFF ANY PROBLEMS ON THIS QUESTIONNAIRE, HOW DIFFICULT HAVE THESE PROBLEMS MADE IT FOR YOU TO DO YOUR WORK, TAKE CARE OF THINGS AT HOME, OR GET ALONG WITH OTHER PEOPLE: NOT DIFFICULT AT ALL
7. FEELING AFRAID AS IF SOMETHING AWFUL MIGHT HAPPEN: NOT AT ALL
2. NOT BEING ABLE TO STOP OR CONTROL WORRYING: NOT AT ALL
3. WORRYING TOO MUCH ABOUT DIFFERENT THINGS: NOT AT ALL
GAD7 TOTAL SCORE: 0
6. BECOMING EASILY ANNOYED OR IRRITABLE: NOT AT ALL
1. FEELING NERVOUS, ANXIOUS, OR ON EDGE: NOT AT ALL
GAD7 TOTAL SCORE: 0
5. BEING SO RESTLESS THAT IT IS HARD TO SIT STILL: NOT AT ALL

## 2022-09-26 ASSESSMENT — PATIENT HEALTH QUESTIONNAIRE - PHQ9
SUM OF ALL RESPONSES TO PHQ QUESTIONS 1-9: 0
5. POOR APPETITE OR OVEREATING: NOT AT ALL

## 2022-09-26 NOTE — PROGRESS NOTES
Assessment & Plan       ICD-10-CM    1. Umbilical hernia without obstruction and without gangrene  K42.9       2. Generalized anxiety disorder  F41.1 escitalopram (LEXAPRO) 10 MG tablet      3. Gastroesophageal reflux disease without esophagitis  K21.9 omeprazole (PRILOSEC) 20 MG DR capsule         Mental health doing well, no changes today  GERD stable, no changes to meds  Umbilical hernia, chronic, discussed surgical referral and reasons to seek emergent care      Patient Instructions   No change to medications    Recommend Flu and covid shots at your pharmacy    Umbilical hernia: let me know if you want to see a surgeon    Follow-up 6 months, sooner if needed        Masood Liriano MD, [facility]       Subjective     Charlie Chacko is a 72 year old male who presents to clinic today for the following health issues:    HPI   Chief Complaint   Patient presents with     Recheck Medication     Mental health med refill      Anxiety Follow-Up    How are you doing with your anxiety since your last visit? No change doing well    Are you having other symptoms that might be associated with anxiety? No    Have you had a significant life event? No     Are you feeling depressed? No    Do you have any concerns with your use of alcohol or other drugs? No    Social History     Tobacco Use     Smoking status: Never     Smokeless tobacco: Never   Substance Use Topics     Alcohol use: Yes     Alcohol/week: 0.8 standard drinks     Types: 1 Glasses of wine per week     Drug use: No     NYASIA-7 SCORE 6/28/2021 3/18/2022 9/26/2022   Total Score 8 0 0     PHQ 6/28/2021 3/18/2022 9/26/2022   PHQ-9 Total Score 3 1 0   Q9: Thoughts of better off dead/self-harm past 2 weeks Not at all Not at all Not at all     GERD: doing well, controled with meds. No melena.   Belly button: small bump. Not painful. No redness. Stools regular. No GI sx.        Objective    /82 (BP Location: Right arm, Patient Position: Sitting,  "Cuff Size: Adult Large)   Pulse 62   Temp 97.2  F (36.2  C) (Temporal)   Ht 1.778 m (5' 10\")   Wt 96.2 kg (212 lb)   SpO2 96%   BMI 30.42 kg/m    Body mass index is 30.42 kg/m .  Physical Exam   Alert, NAD  NC/AT  Sclerae anicteric  Regular  Resp nonlabored  Abd soft, small easily reducible umb hernia, nontender  Skin warm and dry  No focal neuro deficits. Speech intact. Normal gait.  Appropriate affect    Labs reviewed       "

## 2022-09-26 NOTE — NURSING NOTE
Chief Complaint   Patient presents with     Recheck Medication     Mental health med refill       Pre-visit Screening:  Immunizations:  not up to date - will get high dose flu at a later date  Colonoscopy:  is up to date  Mammogram: NA  Asthma Action Test/Plan:  NA  PHQ9:  Done today  GAD7:  Done today  Questioned patient about current smoking habits Pt. has never smoked.  Ok to leave detailed message on voice mail for today's visit only Yes, phone # [phone]

## 2022-09-26 NOTE — PATIENT INSTRUCTIONS
No change to medications    Recommend Flu and covid shots at your pharmacy    Umbilical hernia: let me know if you want to see a surgeon    Follow-up 6 months, sooner if needed

## 2022-11-26 ENCOUNTER — HEALTH MAINTENANCE LETTER (OUTPATIENT)
Age: 72
End: 2022-11-26

## 2023-05-05 DIAGNOSIS — J45.40 MODERATE PERSISTENT ASTHMA, UNSPECIFIED WHETHER COMPLICATED: ICD-10-CM

## 2023-05-08 RX ORDER — FLUTICASONE FUROATE AND VILANTEROL TRIFENATATE 100; 25 UG/1; UG/1
POWDER RESPIRATORY (INHALATION)
Refills: 3 | COMMUNITY
Start: 2023-05-08

## 2023-05-08 RX ORDER — ALBUTEROL SULFATE 90 UG/1
AEROSOL, METERED RESPIRATORY (INHALATION)
Refills: 3 | COMMUNITY
Start: 2023-05-08

## 2023-05-08 NOTE — TELEPHONE ENCOUNTER
Charlie Chacko is requesting a refill of:    Refused Prescriptions:                       Disp   Refills    VENTOLIN  (90 Base) MCG/ACT inhaler*       3        Sig: INHALE 1-2 PUFFS BY MOUTH EVERY 4 HOURS AS NEEDED FOR           SHORTNESS OF BREATH / DYSPNEA OR WHEEZING  Refused By: ANDREINA MELENDEZ  Reason for Refusal: Patient needs appointment    BREO ELLIPTA 100-25 MCG/ACT inhaler [Pharm*       3        Sig: TAKE 1 PUFF BY MOUTH EVERY DAY  Refused By: ANDREINA MELENDEZ  Reason for Refusal: Patient needs appointment    Pt last seen 09/26/22 advised to return in 6 months. Pt due for OV

## 2023-05-16 ENCOUNTER — OFFICE VISIT (OUTPATIENT)
Dept: FAMILY MEDICINE | Facility: CLINIC | Age: 73
End: 2023-05-16

## 2023-05-16 VITALS
HEART RATE: 66 BPM | SYSTOLIC BLOOD PRESSURE: 136 MMHG | OXYGEN SATURATION: 95 % | BODY MASS INDEX: 30.06 KG/M2 | WEIGHT: 210 LBS | TEMPERATURE: 97.6 F | RESPIRATION RATE: 20 BRPM | DIASTOLIC BLOOD PRESSURE: 88 MMHG | HEIGHT: 70 IN

## 2023-05-16 DIAGNOSIS — Z00.00 ENCOUNTER FOR MEDICARE ANNUAL WELLNESS EXAM: ICD-10-CM

## 2023-05-16 DIAGNOSIS — Z12.11 SPECIAL SCREENING FOR MALIGNANT NEOPLASMS, COLON: ICD-10-CM

## 2023-05-16 DIAGNOSIS — R06.09 EXERTIONAL DYSPNEA: ICD-10-CM

## 2023-05-16 DIAGNOSIS — Z12.5 PROSTATE CANCER SCREENING: ICD-10-CM

## 2023-05-16 DIAGNOSIS — J45.40 MODERATE PERSISTENT ASTHMA, UNSPECIFIED WHETHER COMPLICATED: ICD-10-CM

## 2023-05-16 DIAGNOSIS — Z13.220 LIPID SCREENING: ICD-10-CM

## 2023-05-16 DIAGNOSIS — F41.1 GENERALIZED ANXIETY DISORDER: Primary | ICD-10-CM

## 2023-05-16 LAB
BUN SERPL-MCNC: 10 MG/DL (ref 7–25)
BUN/CREATININE RATIO: 8.5 (ref 6–32)
CALCIUM SERPL-MCNC: 9.1 MG/DL (ref 8.6–10.3)
CHLORIDE SERPLBLD-SCNC: 103.4 MMOL/L (ref 98–110)
CHOLEST SERPL-MCNC: 231 MG/DL (ref 0–199)
CHOLEST/HDLC SERPL: 4 {RATIO} (ref 0–5)
CO2 SERPL-SCNC: 28.7 MMOL/L (ref 20–32)
CREAT SERPL-MCNC: 1.17 MG/DL (ref 0.6–1.3)
GLUCOSE SERPL-MCNC: 96 MG/DL (ref 60–99)
HDLC SERPL-MCNC: 58 MG/DL (ref 40–150)
LDLC SERPL CALC-MCNC: 146 MG/DL (ref 0–130)
POTASSIUM SERPL-SCNC: 4.86 MMOL/L (ref 3.5–5.3)
SODIUM SERPL-SCNC: 138.4 MMOL/L (ref 135–146)
TRIGL SERPL-MCNC: 136 MG/DL (ref 0–149)

## 2023-05-16 PROCEDURE — G0439 PPPS, SUBSEQ VISIT: HCPCS | Performed by: STUDENT IN AN ORGANIZED HEALTH CARE EDUCATION/TRAINING PROGRAM

## 2023-05-16 PROCEDURE — 80061 LIPID PANEL: CPT | Performed by: STUDENT IN AN ORGANIZED HEALTH CARE EDUCATION/TRAINING PROGRAM

## 2023-05-16 PROCEDURE — 80048 BASIC METABOLIC PNL TOTAL CA: CPT | Performed by: STUDENT IN AN ORGANIZED HEALTH CARE EDUCATION/TRAINING PROGRAM

## 2023-05-16 PROCEDURE — 99214 OFFICE O/P EST MOD 30 MIN: CPT | Mod: 25 | Performed by: STUDENT IN AN ORGANIZED HEALTH CARE EDUCATION/TRAINING PROGRAM

## 2023-05-16 PROCEDURE — 36415 COLL VENOUS BLD VENIPUNCTURE: CPT | Performed by: STUDENT IN AN ORGANIZED HEALTH CARE EDUCATION/TRAINING PROGRAM

## 2023-05-16 RX ORDER — ALBUTEROL SULFATE 90 UG/1
1-2 AEROSOL, METERED RESPIRATORY (INHALATION) EVERY 4 HOURS PRN
Qty: 18 G | Refills: 3 | Status: SHIPPED | OUTPATIENT
Start: 2023-05-16 | End: 2024-04-02

## 2023-05-16 RX ORDER — FLUTICASONE FUROATE AND VILANTEROL 100; 25 UG/1; UG/1
1 POWDER RESPIRATORY (INHALATION) DAILY
Qty: 60 EACH | Refills: 5 | Status: SHIPPED | OUTPATIENT
Start: 2023-05-16 | End: 2023-08-08

## 2023-05-16 RX ORDER — ESCITALOPRAM OXALATE 10 MG/1
10 TABLET ORAL DAILY
Qty: 90 TABLET | Refills: 1 | Status: SHIPPED | OUTPATIENT
Start: 2023-05-16 | End: 2023-08-08

## 2023-05-16 ASSESSMENT — ANXIETY QUESTIONNAIRES
7. FEELING AFRAID AS IF SOMETHING AWFUL MIGHT HAPPEN: NOT AT ALL
3. WORRYING TOO MUCH ABOUT DIFFERENT THINGS: SEVERAL DAYS
IF YOU CHECKED OFF ANY PROBLEMS ON THIS QUESTIONNAIRE, HOW DIFFICULT HAVE THESE PROBLEMS MADE IT FOR YOU TO DO YOUR WORK, TAKE CARE OF THINGS AT HOME, OR GET ALONG WITH OTHER PEOPLE: NOT DIFFICULT AT ALL
5. BEING SO RESTLESS THAT IT IS HARD TO SIT STILL: SEVERAL DAYS
2. NOT BEING ABLE TO STOP OR CONTROL WORRYING: SEVERAL DAYS
6. BECOMING EASILY ANNOYED OR IRRITABLE: NOT AT ALL
1. FEELING NERVOUS, ANXIOUS, OR ON EDGE: SEVERAL DAYS
GAD7 TOTAL SCORE: 4
GAD7 TOTAL SCORE: 4

## 2023-05-16 ASSESSMENT — ASTHMA QUESTIONNAIRES: ACT_TOTALSCORE: 18

## 2023-05-16 ASSESSMENT — PATIENT HEALTH QUESTIONNAIRE - PHQ9
SUM OF ALL RESPONSES TO PHQ QUESTIONS 1-9: 2
5. POOR APPETITE OR OVEREATING: NOT AT ALL

## 2023-05-16 NOTE — LETTER
My Asthma Action Plan    Name: Charlie Chacko   YOB: 1950  Date: 5/16/2023   My doctor: TAY SALAZAR MD   [place]        My Control Medicine: Fluticasone furoate + vilanterol (Breo Ellipta)  -  100/25mcg Uses daily  My Rescue Medicine: Albuterol (Proair/Ventolin/Proventil HFA) 2-4 puffs EVERY 4 HOURS as needed. Use a spacer if recommended by your provider.  My Oral Steroid Medicine: No current oral steroids-not needed My Asthma Severity:   Moderate Persistent  Know your asthma triggers: dust mites, pollens, animal dander, mold, humidity, strong odors and fumes, and exercise or sports              GREEN ZONE   Good Control  I feel good  No cough or wheeze  Can work, sleep and play without asthma symptoms       Take your asthma control medicine every day.     If exercise triggers your asthma, take your rescue medication  15 minutes before exercise or sports, and  During exercise if you have asthma symptoms  Spacer to use with inhaler: If you have a spacer, make sure to use it with your inhaler             YELLOW ZONE Getting Worse  I have ANY of these:  I do not feel good  Cough or wheeze  Chest feels tight  Wake up at night   Keep taking your Green Zone medications  Start taking your rescue medicine:  every 20 minutes for up to 1 hour. Then every 4 hours for 24-48 hours.  If you stay in the Yellow Zone for more than 12-24 hours, contact your doctor.  If you do not return to the Green Zone in 12-24 hours or you get worse, start taking your oral steroid medicine if prescribed by your provider.           RED ZONE Medical Alert - Get Help  I have ANY of these:  I feel awful  Medicine is not helping  Breathing getting harder  Trouble walking or talking  Nose opens wide to breathe       Take your rescue medicine NOW  If your provider has prescribed an oral steroid medicine, start taking it NOW  Call your doctor NOW  If you are still in the Red Zone after 20 minutes  and you have not reached your doctor:  Take your rescue medicine again and  Call 911 or go to the emergency room right away    See your regular doctor within 2 weeks of an Emergency Room or Urgent Care visit for follow-up treatment.          Annual Reminders:  Meet with Asthma Educator,  Flu Shot in the Fall, consider Pneumonia Vaccination for patients with asthma (aged 19 and older).    Pharmacy: [facility] [id number] [place] [id number] [address]    Electronically signed by TAY SALAZAR MD   Date: 05/16/23                      Asthma Triggers  How To Control Things That Make Your Asthma Worse    Triggers are things that make your asthma worse.  Look at the list below to help you find your triggers and what you can do about them.  You can help prevent asthma flare-ups by staying away from your triggers.      Trigger                                                          What you can do   Cigarette Smoke  Tobacco smoke can make asthma worse. Do not allow smoking in your home, car or around you.  Be sure no one smokes at a child s day care or school.  If you smoke, ask your health care provider for ways to help you quit.  Ask family members to quit too.  Ask your health care provider for a referral to Quit Plan to help you quit smoking, or call 9-146-864-PLAN.     Colds, Flu, Bronchitis  These are common triggers of asthma. Wash your hands often.  Don t touch your eyes, nose or mouth.  Get a flu shot every year.     Dust Mites  These are tiny bugs that live in cloth or carpet. They are too small to see. Wash sheets and blankets in hot water every week.   Encase pillows and mattress in dust mite proof covers.  Avoid having carpet if you can. If you have carpet, vacuum weekly.   Use a dust mask and HEPA vacuum.   Pollen and Outdoor Mold  Some people are allergic to trees, grass, or weed pollen, or molds. Try to keep your windows closed.  Limit time out doors when pollen count is high.   Ask you health care  provider about taking medicine during allergy season.     Animal Dander  Some people are allergic to skin flakes, urine or saliva from pets with fur or feathers. Keep pets with fur or feathers out of your home.    If you can t keep the pet outdoors, then keep the pet out of your bedroom.  Keep the bedroom door closed.  Keep pets off cloth furniture and away from stuffed toys.     Mice, Rats, and Cockroaches   Some people are allergic to the waste from these pests.   Cover food and garbage.  Clean up spills and food crumbs.  Store grease in the refrigerator.   Keep food out of the bedroom.   Indoor Mold  This can be a trigger if your home has high moisture. Fix leaking faucets, pipes, or other sources of water.   Clean moldy surfaces.  Dehumidify basement if it is damp and smelly.   Smoke, Strong Odors, and Sprays  These can reduce air quality. Stay away from strong odors and sprays, such as perfume, powder, hair spray, paints, smoke incense, paint, cleaning products, candles and new carpet.   Exercise or Sports  Some people with asthma have this trigger. Be active!  Ask your doctor about taking medicine before sports or exercise to prevent symptoms.    Warm up for 5-10 minutes before and after sports or exercise.     Other Triggers of Asthma  Cold air:  Cover your nose and mouth with a scarf.  Sometimes laughing or crying can be a trigger.  Some medicines and food can trigger asthma.

## 2023-05-16 NOTE — PROGRESS NOTES
Assessment & Plan     1. Moderate persistent asthma, unspecified whether complicated  ACT Score 18 today, discussed increasing breo but pt elects to wait for now. Continue allergy tx.   - fluticasone-vilanterol (BREO ELLIPTA) 100-25 MCG/ACT inhaler; Inhale 1 puff into the lungs daily  Dispense: 60 each; Refill: 5  - albuterol (PROAIR HFA/PROVENTIL HFA/VENTOLIN HFA) 108 (90 Base) MCG/ACT inhaler; Inhale 1-2 puffs into the lungs every 4 hours as needed for shortness of breath or wheezing  Dispense: 18 g; Refill: 3    2. Exertional dyspnea  Noted for past year. While could be related to asthma, pt notes this feels different. Prior CT calcium reviewed, recommend Cardiology consult to discuss further testing.   - Adult Cardiology Eval  Referral  - Basic Metabolic Panel (BFP)    3. Lipid screening  Recheck labs   - Lipid Panel (BFP)    4. Generalized anxiety disorder  Trial increase lexapro to 15mg    5. Encounter for Medicare annual wellness exam  See separate note    6. Prostate cancer screening  Recheck labs, discussed r/b/a  - PSA Total ([facility])    7. Special screening for malignant neoplasms, colon  - Colonoscopy Screening  Referral     Patient Instructions   Increase lexapro to 15mg daily for one month    Otherwise continue current medications (can increase breo dose if needed)     Schedule Cardiology appointment: [facility]   Dr. Carmencita Cid  [address]  [address]  [place] [address]  [phone] -- appt line    schdule colonoscopy  [facility]  [address]  [address]  [facility] [id number] [phone] - appt line     Bring copy of healthcare directive when able     Follow-up with me in 3-4 months, sooner if needed      Reasons to follow-up sooner or seek emergent care reviewed.     Masood Liriano MD, [facility]       Subjective     Charlie Chacko is a 73 year old male who presents to clinic today for the following health  issues:    HPI   Chief Complaint   Patient presents with     Recheck Medication     Non fasting medication check and review, needs refills of inhalers, ACT and asthma action plan due, wants to talk about increasing dose of lexapro due to feeling more anxiety then normal lately      Medicare Visit     Annual medicare wellness visit       Asthma Follow-Up  Allergies have been more bothersome this spring. Had been doing breo every other day until a few weeks ago, has been using daily since  Was ACT completed today?  Yes        8/26/2022     2:27 PM   ACT Total Scores   ACT TOTAL SCORE (Goal Greater than or Equal to 20) 20   In the past 12 months, how many times did you visit the emergency room for your asthma without being admitted to the hospital? 0   In the past 12 months, how many times were you hospitalized overnight because of your asthma? 0          How many days per week do you miss taking your asthma controller medication?  0    Please describe any recent triggers for your asthma: upper respiratory infections and seasonal allergies    Have you had any Emergency Room Visits, Urgent Care Visits, or Hospital Admissions since your last office visit?  No    Also reports exertional SOB increasing over past year and feels different from usual asthma sensation.     Anxiety Follow-Up    How are you doing with your anxiety since your last visit? No change, still feels anxious more than he'd like     Are you having other symptoms that might be associated with anxiety? No    Have you had a significant life event? No     Are you feeling depressed? No    Do you have any concerns with your use of alcohol or other drugs? No    Social History     Tobacco Use     Smoking status: Never     Smokeless tobacco: Never   Substance Use Topics     Alcohol use: Yes     Alcohol/week: 0.8 standard drinks of alcohol     Types: 1 Glasses of wine per week     Drug use: No         6/28/2021    10:24 AM 3/18/2022     3:39 PM 9/26/2022     1:51  "PM   NYASIA-7 SCORE   Total Score 8 0 0         6/28/2021    10:24 AM 3/18/2022     3:39 PM 9/26/2022     1:51 PM   PHQ   PHQ-9 Total Score 3 1 0   Q9: Thoughts of better off dead/self-harm past 2 weeks Not at all Not at all Not at all           Objective    /88 (BP Location: Right arm, Patient Position: Sitting, Cuff Size: Adult Large)   Pulse 66   Temp 97.6  F (36.4  C) (Temporal)   Resp 20   Ht 1.778 m (5' 10\")   Wt 95.3 kg (210 lb)   SpO2 95%   BMI 30.13 kg/m    Body mass index is 30.13 kg/m .  Alert, NAD  NC/AT  Sclerae anicteric  RRR, no edema  Resp nonlabored, no wheezes or rhonchi   Skin warm and dry  No focal neuro deficits. Speech intact.   Appropriate affect  Normal gait.      Labs reviewed.          "

## 2023-05-16 NOTE — PROGRESS NOTES
Charlie Chacko is a 73 year old male who presents for Medicare Annual Wellness Visit.    Current providers caring for this patient include:  Patient Care Team:  Masood Liriano MD as PCP - General (Family Medicine)  Aramis Haney MD as Assigned PCP    Complete Medical and Social history reviewed with patient, outlined below.    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)     ACP (advance care planning)     Intermittent asthma     GERD (gastroesophageal reflux disease)     Hyperlipidemia     HYPERLIPIDEMIA LDL GOAL <160     Health Care Home       Past Medical History:   Diagnosis Date     TOXIC EFFECT FISH/SHELLFISH     allergy        Past Surgical History:   Procedure Laterality Date     HC REPAIR RECURR INGUIN NIYA,REDUCIBL  1952    Hernia, inguinal     HC VASECTOMY UNILAT/BILAT W POSTOP SEMEN      Vasectomy     ZZC EYE EXAM ESTABLISHED PT  2002       Family History   Problem Relation Age of Onset     Heart Disease Father         MI x 5 but started at 79     Coronary Artery Disease Father      Cancer Maternal Aunt         breast     Diabetes No family hx of      Cancer - colorectal No family hx of      Prostate Cancer No family hx of      Colon Cancer No family hx of      Cerebrovascular Disease No family hx of        Social History     Tobacco Use     Smoking status: Never     Smokeless tobacco: Never   Vaping Use     Vaping status: Not on file   Substance Use Topics     Alcohol use: Yes     Alcohol/week: 0.8 standard drinks of alcohol     Types: 1 Glasses of wine per week       Diet: Feels that he could do better with picking more low fat foods, does alright on salt intake, fruit and vegetable intake is good, likes to eat a lot of salad, limited dairy but does eat cheese and have milk in his morning cereal, feels that he could cut down on sugar   Physical Activity: active without specific exercise program-takes dog on long walks everyday so does try and  "stay as active as possible-reaches about 5000 steps per day   Depression Screen:    Over the past 2 weeks, patient has felt down, depressed, or hopeless:  PHQ9 given today-on medication    Over the past 2 weeks, patient has felt little interest or pleasure in doing things: PHQ9 given today-pt on medication    Functional ability/Safety screen:  Up and go test (able to get up and walk longer than 30 seconds): Passed  Patient needs assistance with: nothing  Patient's home has the following possible safety concerns: none identified  Patient has concerns about his hearing:  No  Cognitive Screen  Patient repeats three objects (ball, flag, tree)      Clock drawing test:   NORMAL  Recalls three objects after 3 minutes (ball,flag,tree):                                                                                               recalls 2 objects (3 points)    Physical Exam:  /88 (BP Location: Right arm, Patient Position: Sitting, Cuff Size: Adult Large)   Pulse 66   Temp 97.6  F (36.4  C) (Temporal)   Resp 20   Ht 1.778 m (5' 10\")   Wt 95.3 kg (210 lb)   SpO2 95%   BMI 30.13 kg/m     Body mass index is 30.13 kg/m .         End of Life Planning:   Patient currently has an advanced directive: Yes.  Practitioner is supportive of decision.    Education/Counseling:   Based on review of the above information, the following items were addressed:    Appropriate preventive services were discussed with this patient, including applicable screening as appropriate for cardiovascular disease, diabetes, osteopenia/osteoporosis, and glaucoma.  As appropriate for age/gender, discussed screening for colorectal cancer, prostate cancer, breast cancer, and cervical cancer.   Checklist reviewing preventive services available has been given to the patient.          Masood Liriano MD, [facility]      "

## 2023-05-16 NOTE — NURSING NOTE
Chief Complaint   Patient presents with     Recheck Medication     Non fasting medication check and review, needs refills of inhalers, ACT and asthma action plan due, wants to talk about increasing dose of lexapro due to feeling more anxiety then normal lately      Medicare Visit     Annual medicare wellness visit      Pre-visit Screening:  Immunizations:  up to date  Colonoscopy:  is due and ordered today  Mammogram: na  Asthma Action Test/Plan: ACT given today   PHQ9:  Given today   GAD7:  Given today   Questioned patient about current smoking habits Pt. has never smoked.  Ok to leave detailed message on voice mail for today's visit only yes, phone # [phone]

## 2023-05-16 NOTE — PATIENT INSTRUCTIONS
Increase lexapro to 15mg daily for one month    Otherwise continue current medications (can increase breo dose if needed)     Schedule Cardiology appointment: [facility]   Dr. Carmencita Cid  [address]  [address]  [place] [address]  [phone] -- appt line    schdule colonoscopy  [facility]  [address]  [address]  [facility] [id number] [phone] - appt line     Bring copy of healthcare directive when able     Follow-up with me in 3-4 months, sooner if needed

## 2023-05-18 LAB — ABBOTT PSA - QUEST: 1.83 NG/ML

## 2023-07-17 ENCOUNTER — TRANSFERRED RECORDS (OUTPATIENT)
Dept: FAMILY MEDICINE | Facility: CLINIC | Age: 73
End: 2023-07-17

## 2023-08-08 DIAGNOSIS — J45.40 MODERATE PERSISTENT ASTHMA, UNSPECIFIED WHETHER COMPLICATED: ICD-10-CM

## 2023-08-08 DIAGNOSIS — F41.1 GENERALIZED ANXIETY DISORDER: ICD-10-CM

## 2023-08-08 RX ORDER — ESCITALOPRAM OXALATE 10 MG/1
10 TABLET ORAL DAILY
Qty: 90 TABLET | Refills: 1 | Status: SHIPPED | OUTPATIENT
Start: 2023-08-08 | End: 2023-08-14

## 2023-08-08 RX ORDER — FLUTICASONE FUROATE AND VILANTEROL 100; 25 UG/1; UG/1
1 POWDER RESPIRATORY (INHALATION) DAILY
Qty: 180 EACH | Refills: 1 | Status: SHIPPED | OUTPATIENT
Start: 2023-08-08 | End: 2023-08-18

## 2023-08-08 NOTE — TELEPHONE ENCOUNTER
Patient called into the CS line asking for a new rx for his lexapro to be sent in reflecting his new dose of taking 15 mg per day rather then 10 mg. He gets a better deal when getting a 3 month supply of his Breo Ellipta inhaler and is asking for this to be sent in.      Pending Prescriptions:                       Disp   Refills    escitalopram (LEXAPRO) 10 MG tablet       90 tab*1            Sig: Take 1 tablet (10 mg) by mouth daily    fluticasone-vilanterol (BREO ELLIPTA) 100*180 ea*1            Sig: Inhale 1 puff into the lungs daily    Routing to Dr. Liriano for review and to send in.

## 2023-08-14 RX ORDER — ESCITALOPRAM OXALATE 10 MG/1
15 TABLET ORAL DAILY
Qty: 135 TABLET | Refills: 1 | Status: SHIPPED | OUTPATIENT
Start: 2023-08-14 | End: 2023-10-11

## 2023-08-14 NOTE — TELEPHONE ENCOUNTER
Please resend Escitalopram to reflect pt taking 15Mg daily.    Charlie GENE Chacko is requesting a refill of:    Pending Prescriptions:                       Disp   Refills    escitalopram (LEXAPRO) 10 MG tablet       135 ta*1            Sig: Take 1.5 tablets (15 mg) by mouth daily    Signed Prescriptions:                        Disp   Refills    escitalopram (LEXAPRO) 10 MG tablet        90 tab*1        Sig: Take 1 tablet (10 mg) by mouth daily  Authorizing Provider: TAY SALAZAR    fluticasone-vilanterol (BREO ELLIPTA) 100-*180 ea*1        Sig: Inhale 1 puff into the lungs daily  Authorizing Provider: TAY SALAZAR

## 2023-08-15 DIAGNOSIS — K21.9 GASTROESOPHAGEAL REFLUX DISEASE WITHOUT ESOPHAGITIS: ICD-10-CM

## 2023-08-15 DIAGNOSIS — J45.40 MODERATE PERSISTENT ASTHMA, UNSPECIFIED WHETHER COMPLICATED: ICD-10-CM

## 2023-08-17 NOTE — TELEPHONE ENCOUNTER
Charlie Chacko is requesting a refill of:    Pending Prescriptions:                       Disp   Refills    omeprazole (PRILOSEC) 20 MG DR capsule [P*30 cap*0            Sig: TAKE 1 CAPSULE BY MOUTH EVERY DAY    BREO ELLIPTA 100-25 MCG/ACT inhaler [Phar*60 each0            Sig: TAKE 1 PUFF BY MOUTH EVERY DAY    Pt needs OV for refills

## 2023-08-18 RX ORDER — FLUTICASONE FUROATE AND VILANTEROL TRIFENATATE 100; 25 UG/1; UG/1
1 POWDER RESPIRATORY (INHALATION) DAILY
Qty: 60 EACH | Refills: 0 | Status: SHIPPED | OUTPATIENT
Start: 2023-08-18 | End: 2023-10-11

## 2023-09-06 DIAGNOSIS — F41.1 GENERALIZED ANXIETY DISORDER: ICD-10-CM

## 2023-09-08 RX ORDER — ESCITALOPRAM OXALATE 10 MG/1
15 TABLET ORAL DAILY
Qty: 45 TABLET | Refills: 5 | COMMUNITY
Start: 2023-09-08

## 2023-09-08 NOTE — TELEPHONE ENCOUNTER
Charlie Chacko is requesting a refill of:    Refused Prescriptions:                       Disp   Refills    escitalopram (LEXAPRO) 10 MG tablet [Pharm*45 tab*5        Sig: TAKE 1.5 TABLETS BY MOUTH DAILY  Refused By: ANDREINA MELENDEZ  Reason for Refusal: Patient needs appointment    Pt due for OV, previous MyChart message was sent

## 2023-09-13 DIAGNOSIS — K21.9 GASTROESOPHAGEAL REFLUX DISEASE WITHOUT ESOPHAGITIS: ICD-10-CM

## 2023-09-13 NOTE — TELEPHONE ENCOUNTER
Charlie Chacko is requesting a refill of:    Refused Prescriptions:                       Disp   Refills    omeprazole (PRILOSEC) 20 MG DR capsule [Ph*30 cap*0        Sig: TAKE 1 CAPSULE BY MOUTH EVERY DAY **NEED           APPOINTMENT**  Refused By: ANDREINA MELENDEZ  Reason for Refusal: Patient needs appointment    Pt due for OV

## 2023-09-25 DIAGNOSIS — K21.9 GASTROESOPHAGEAL REFLUX DISEASE WITHOUT ESOPHAGITIS: ICD-10-CM

## 2023-10-11 ENCOUNTER — OFFICE VISIT (OUTPATIENT)
Dept: FAMILY MEDICINE | Facility: CLINIC | Age: 73
End: 2023-10-11

## 2023-10-11 VITALS
OXYGEN SATURATION: 99 % | HEART RATE: 64 BPM | TEMPERATURE: 97.8 F | BODY MASS INDEX: 30.85 KG/M2 | WEIGHT: 215 LBS | SYSTOLIC BLOOD PRESSURE: 138 MMHG | RESPIRATION RATE: 20 BRPM | DIASTOLIC BLOOD PRESSURE: 82 MMHG

## 2023-10-11 DIAGNOSIS — K21.9 GASTROESOPHAGEAL REFLUX DISEASE WITHOUT ESOPHAGITIS: ICD-10-CM

## 2023-10-11 DIAGNOSIS — F41.1 GENERALIZED ANXIETY DISORDER: ICD-10-CM

## 2023-10-11 DIAGNOSIS — R03.0 ELEVATED BP WITHOUT DIAGNOSIS OF HYPERTENSION: ICD-10-CM

## 2023-10-11 DIAGNOSIS — J45.40 MODERATE PERSISTENT ASTHMA, UNSPECIFIED WHETHER COMPLICATED: ICD-10-CM

## 2023-10-11 DIAGNOSIS — Z23 NEED FOR VACCINATION: ICD-10-CM

## 2023-10-11 DIAGNOSIS — E78.2 MIXED HYPERLIPIDEMIA: Primary | ICD-10-CM

## 2023-10-11 PROCEDURE — 90662 IIV NO PRSV INCREASED AG IM: CPT | Performed by: STUDENT IN AN ORGANIZED HEALTH CARE EDUCATION/TRAINING PROGRAM

## 2023-10-11 PROCEDURE — G0008 ADMIN INFLUENZA VIRUS VAC: HCPCS | Performed by: STUDENT IN AN ORGANIZED HEALTH CARE EDUCATION/TRAINING PROGRAM

## 2023-10-11 PROCEDURE — 99214 OFFICE O/P EST MOD 30 MIN: CPT | Mod: 25 | Performed by: STUDENT IN AN ORGANIZED HEALTH CARE EDUCATION/TRAINING PROGRAM

## 2023-10-11 RX ORDER — ESCITALOPRAM OXALATE 10 MG/1
15 TABLET ORAL DAILY
Qty: 135 TABLET | Refills: 1 | Status: SHIPPED | OUTPATIENT
Start: 2023-10-11 | End: 2024-04-02

## 2023-10-11 RX ORDER — FLUTICASONE FUROATE AND VILANTEROL TRIFENATATE 100; 25 UG/1; UG/1
1 POWDER RESPIRATORY (INHALATION) DAILY
Qty: 60 EACH | Refills: 3 | Status: SHIPPED | OUTPATIENT
Start: 2023-10-11 | End: 2024-04-26

## 2023-10-11 ASSESSMENT — ANXIETY QUESTIONNAIRES
GAD7 TOTAL SCORE: 4
2. NOT BEING ABLE TO STOP OR CONTROL WORRYING: SEVERAL DAYS
1. FEELING NERVOUS, ANXIOUS, OR ON EDGE: SEVERAL DAYS
IF YOU CHECKED OFF ANY PROBLEMS ON THIS QUESTIONNAIRE, HOW DIFFICULT HAVE THESE PROBLEMS MADE IT FOR YOU TO DO YOUR WORK, TAKE CARE OF THINGS AT HOME, OR GET ALONG WITH OTHER PEOPLE: NOT DIFFICULT AT ALL
5. BEING SO RESTLESS THAT IT IS HARD TO SIT STILL: NOT AT ALL
7. FEELING AFRAID AS IF SOMETHING AWFUL MIGHT HAPPEN: NOT AT ALL
3. WORRYING TOO MUCH ABOUT DIFFERENT THINGS: SEVERAL DAYS
6. BECOMING EASILY ANNOYED OR IRRITABLE: NOT AT ALL
GAD7 TOTAL SCORE: 4

## 2023-10-11 ASSESSMENT — PATIENT HEALTH QUESTIONNAIRE - PHQ9
SUM OF ALL RESPONSES TO PHQ QUESTIONS 1-9: 0
5. POOR APPETITE OR OVEREATING: SEVERAL DAYS

## 2023-10-11 NOTE — PATIENT INSTRUCTIONS
Flu shot today    Start monitoring BP at home, goal to be < 135/85 consistently    Consider RSV and Covid vaccines at your pharmacy     Follow-up in 6 months, fasting blood work at that time

## 2023-10-11 NOTE — PROGRESS NOTES
Assessment & Plan     1. Gastroesophageal reflux disease without esophagitis  Stable, hasn't tolerated taper and will continue PPI  - omeprazole (PRILOSEC) 20 MG DR capsule; Take 1 capsule (20 mg) by mouth daily  Dispense: 90 capsule; Refill: 1    2. Mixed hyperlipidemia  Previous lipitor intolerance, Cardiology notes reviewed, lifestyle changes and recheck in 6 mos    3. Generalized anxiety disorder  Stable, cont current med  - escitalopram (LEXAPRO) 10 MG tablet; Take 1.5 tablets (15 mg) by mouth daily  Dispense: 135 tablet; Refill: 1    4. Elevated BP without diagnosis of hypertension  Start home monitoring    5. Moderate persistent asthma, unspecified whether complicated  Stable, continue inhalers  - BREO ELLIPTA 100-25 MCG/ACT inhaler; Inhale 1 puff into the lungs daily  Dispense: 60 each; Refill: 3    6. Need for vaccination  - INFLUENZA VACCINE 65+ (FLUZONE HD)     Patient Instructions   Flu shot today    Start monitoring BP at home, goal to be < 135/85 consistently    Consider RSV and Covid vaccines at your pharmacy     Follow-up in 6 months, fasting blood work at that time     Reasons to follow-up sooner or seek emergent care reviewed.       Masood Liriano MD, [facility]       Subjective     Charlie Chacko is a 73 year old male who presents to clinic today for the following health issues:    HPI   Chief Complaint   Patient presents with    Recheck Medication     Fasting medication check and review for omeprazole       Hyperlipidemia Follow-Up  Cardiology notes reviewed, follow-up testing normal per patient  Are you regularly taking any medication or supplement to lower your cholesterol?   No didn't tolerate lipitor  Are you having muscle aches or other side effects that you think could be caused by your cholesterol lowering medication?  N/a    GERD well controlled with PPI  Anxiety Follow-Up  How are you doing with your anxiety since your last visit? No change  Are you having  other symptoms that might be associated with anxiety? No  Are you feeling depressed? No  Do you have any concerns with your use of alcohol or other drugs? No    Social History     Tobacco Use    Smoking status: Never    Smokeless tobacco: Never   Substance Use Topics    Alcohol use: Yes     Alcohol/week: 0.8 standard drinks of alcohol     Types: 1 Glasses of wine per week    Drug use: No         3/18/2022     3:39 PM 9/26/2022     1:51 PM 5/16/2023     2:29 PM   NYASIA-7 SCORE   Total Score 0 0 4         3/18/2022     3:39 PM 9/26/2022     1:51 PM 5/16/2023     2:29 PM   PHQ   PHQ-9 Total Score 1 0 2   Q9: Thoughts of better off dead/self-harm past 2 weeks Not at all Not at all Not at all               Objective    /82 (BP Location: Left arm, Patient Position: Sitting, Cuff Size: Adult Large)   Pulse 64   Temp 97.8  F (36.6  C) (Temporal)   Resp 20   Wt 97.5 kg (215 lb)   SpO2 99%   BMI 30.85 kg/m    Body mass index is 30.85 kg/m .  Alert, NAD  NC/AT  Sclerae anicteric  RRR no edema  Resp nonlabored  Skin warm and dry  No focal neuro deficits. Speech intact.   Appropriate affect  Normal gait.      Labs reviewed.

## 2023-10-11 NOTE — NURSING NOTE
Chief Complaint   Patient presents with    Recheck Medication     Fasting medication check and review for omeprazole      Pre-visit Screening:  Immunizations:  up to date  Colonoscopy:  is due and to be scheduled by patient for later completion  Mammogram: na  Asthma Action Test/Plan:  na  PHQ9:  given today due to recent increase in dose for lexapro  GAD7:  given today   Questioned patient about current smoking habits Pt. has never smoked.  Ok to leave detailed message on voice mail for today's visit only yes, phone # [phone] (home)

## 2024-03-22 ENCOUNTER — OFFICE VISIT (OUTPATIENT)
Dept: FAMILY MEDICINE | Facility: CLINIC | Age: 74
End: 2024-03-22

## 2024-03-22 VITALS
BODY MASS INDEX: 30.21 KG/M2 | SYSTOLIC BLOOD PRESSURE: 146 MMHG | OXYGEN SATURATION: 94 % | HEART RATE: 78 BPM | TEMPERATURE: 98.4 F | WEIGHT: 211 LBS | DIASTOLIC BLOOD PRESSURE: 84 MMHG | HEIGHT: 70 IN

## 2024-03-22 DIAGNOSIS — R05.1 ACUTE COUGH: Primary | ICD-10-CM

## 2024-03-22 DIAGNOSIS — J06.9 VIRAL URI WITH COUGH: ICD-10-CM

## 2024-03-22 PROBLEM — H52.223 REGULAR ASTIGMATISM, BILATERAL: Status: ACTIVE | Noted: 2021-05-04

## 2024-03-22 PROBLEM — H52.4 PRESBYOPIA: Status: ACTIVE | Noted: 2021-05-04

## 2024-03-22 LAB
FLUAV AG UPPER RESP QL IA.RAPID: NORMAL
FLUBV AG UPPER RESP QL IA.RAPID: NORMAL
RESPIRATORY SYNCYTIAL VIRUS: NEGATIVE

## 2024-03-22 PROCEDURE — 87804 INFLUENZA ASSAY W/OPTIC: CPT | Performed by: PHYSICIAN ASSISTANT

## 2024-03-22 PROCEDURE — 99213 OFFICE O/P EST LOW 20 MIN: CPT | Performed by: PHYSICIAN ASSISTANT

## 2024-03-22 PROCEDURE — 87634 RSV DNA/RNA AMP PROBE: CPT | Performed by: PHYSICIAN ASSISTANT

## 2024-03-22 NOTE — NURSING NOTE
Chief Complaint   Patient presents with    URI     Symptoms started 1 week ago while in [place], started with headache and chills, cough and congestion, cough is productive, he feels this is heavy in his chest, now loss of voice, negative at home covid test this morning     Pre-visit Screening:  Immunizations:  up to date  Colonoscopy:  is due and to be scheduled by patient for later completion  Mammogram: JONNIE  Asthma Action Test/Plan:  JONNIE  PHQ9:  NA  GAD7:  NA  Questioned patient about current smoking habits Pt. has never smoked.  Ok to leave detailed message on voice mail for today's visit only Yes, phone # [phone]

## 2024-03-22 NOTE — PROGRESS NOTES
"  Assessment & Plan     Acute cough    - Respiratory Syncytial Virus - RSV  - Influenza A and B (BFP)    Viral URI with cough-reassured by pt overall improving, neg Flu/RSV. Suspect other viral origin, possibly now over influenza. Will treat symptomatically and await improvement  -Rest, increase fluids, honey for cough suppression/sore throat  -Add Mucinex and Sudafed D for symptomatic relief  -Ibuprofen/Tylenol as needed for symptomatic relief  Seek emergency care for significant shortness of breath, chest pain, and/or fever >103F that cannot be controlled with antipyretics   Pt should call if any worsening over next 3-7 days including any new fevers, SOB-consider AB at that time      Follow up as needed    No follow-ups on file.    Ashleigh Guerra is a 74 year old, presenting for the following health issues:  URI (Symptoms started 1 week ago while in [place], started with headache and chills, cough and congestion, cough is productive, he feels this is heavy in his chest, now loss of voice, negative at home covid test this morning)    HPI   Started with sinus drainage, sore throat, chest congestion  Multiple negative COVID tests.   Recently travelling in [place]-back last night  Overall he is slowly improving-felt like \"he got hit by a bus\" while in [place]    Acute Illness  Acute illness concerns: Laryngitis  Onset/Duration: 1 week  Symptoms:  Fever: YES  Chills/Sweats: YES  Headache (location?): YES  Sinus Pressure: YES  Conjunctivitis:  No  Ear Pain: YES: right  Rhinorrhea: YES  Congestion: YES  Sore Throat: YES  Cough: YES-productive of green sputum  Wheeze: YES at night  Decreased Appetite: No  Nausea: YES  Vomiting: No  Diarrhea: No  Dysuria/Freq.: No  Dysuria or Hematuria: No  Fatigue/Achiness: YES  Sick/Strep Exposure: No  Therapies tried and outcome: decongestant, fluids, ibuprofen, ASA, allergy meds        Review of Systems  Constitutional, HEENT, cardiovascular, pulmonary, gi and gu systems are negative, " "except as otherwise noted.      Objective    BP (!) 146/84 (BP Location: Right arm, Patient Position: Sitting, Cuff Size: Adult Large)   Pulse 78   Temp 98.4  F (36.9  C) (Temporal)   Ht 1.778 m (5' 10\")   Wt 95.7 kg (211 lb)   SpO2 94%   BMI 30.28 kg/m    Body mass index is 30.28 kg/m .  Physical Exam   GENERAL: alert and no distress  HENT: ear canals and TM's normal, nose and mouth without ulcers or lesions, no sinus tenderness  NECK: no adenopathy, no asymmetry, masses, or scars  RESP: lungs clear to auscultation - no rales, rhonchi or wheezes  CV: regular rate and rhythm, normal S1 S2, no S3 or S4, no murmur, click or rub, no peripheral edema  ABDOMEN: soft, nontender, no hepatosplenomegaly, no masses and bowel sounds normal  MS: no gross musculoskeletal defects noted, no edema  NEURO: Normal strength and tone, mentation intact and speech normal  PSYCH: mentation appears normal, affect normal/bright    Results for orders placed or performed in visit on 03/22/24 (from the past 24 hour(s))   Respiratory Syncytial Virus - RSV   Result Value Ref Range    RESPIRATORY SYNCYTIAL VIRUS Negative    Influenza A and B (BFP)   Result Value Ref Range    Influenza A neg neg    Influenza B neg neg           Signed Electronically by: Arnold Cantu PA-C      "

## 2024-03-27 DIAGNOSIS — K21.9 GASTROESOPHAGEAL REFLUX DISEASE WITHOUT ESOPHAGITIS: ICD-10-CM

## 2024-03-27 NOTE — TELEPHONE ENCOUNTER
Received incoming refill request for  Pending Prescriptions:                       Disp   Refills    omeprazole (PRILOSEC) 20 MG DR capsule [P*90 cap*1            Sig: TAKE 1 CAPSULE BY MOUTH EVERY DAY    Patient is due for a medication check visit.

## 2024-04-01 RX ORDER — ESCITALOPRAM OXALATE 10 MG/1
15 TABLET ORAL DAILY
Qty: 135 TABLET | Refills: 1 | Status: CANCELLED | OUTPATIENT
Start: 2024-04-01

## 2024-04-01 RX ORDER — FLUTICASONE FUROATE AND VILANTEROL TRIFENATATE 100; 25 UG/1; UG/1
1 POWDER RESPIRATORY (INHALATION) DAILY
Qty: 60 EACH | Refills: 3 | Status: CANCELLED | OUTPATIENT
Start: 2024-04-01

## 2024-04-02 ENCOUNTER — OFFICE VISIT (OUTPATIENT)
Dept: FAMILY MEDICINE | Facility: CLINIC | Age: 74
End: 2024-04-02

## 2024-04-02 VITALS
SYSTOLIC BLOOD PRESSURE: 132 MMHG | OXYGEN SATURATION: 97 % | DIASTOLIC BLOOD PRESSURE: 80 MMHG | BODY MASS INDEX: 29.35 KG/M2 | WEIGHT: 205 LBS | HEIGHT: 70 IN | TEMPERATURE: 98 F | HEART RATE: 77 BPM

## 2024-04-02 DIAGNOSIS — K21.9 GASTROESOPHAGEAL REFLUX DISEASE WITHOUT ESOPHAGITIS: ICD-10-CM

## 2024-04-02 DIAGNOSIS — J45.40 MODERATE PERSISTENT ASTHMA, UNSPECIFIED WHETHER COMPLICATED: ICD-10-CM

## 2024-04-02 DIAGNOSIS — F41.1 GENERALIZED ANXIETY DISORDER: ICD-10-CM

## 2024-04-02 DIAGNOSIS — E78.2 MIXED HYPERLIPIDEMIA: Primary | ICD-10-CM

## 2024-04-02 PROCEDURE — G2211 COMPLEX E/M VISIT ADD ON: HCPCS | Performed by: STUDENT IN AN ORGANIZED HEALTH CARE EDUCATION/TRAINING PROGRAM

## 2024-04-02 PROCEDURE — 99214 OFFICE O/P EST MOD 30 MIN: CPT | Performed by: STUDENT IN AN ORGANIZED HEALTH CARE EDUCATION/TRAINING PROGRAM

## 2024-04-02 RX ORDER — ALBUTEROL SULFATE 90 UG/1
1-2 AEROSOL, METERED RESPIRATORY (INHALATION) EVERY 4 HOURS PRN
Qty: 18 G | Refills: 3 | Status: SHIPPED | OUTPATIENT
Start: 2024-04-02

## 2024-04-02 ASSESSMENT — ANXIETY QUESTIONNAIRES
5. BEING SO RESTLESS THAT IT IS HARD TO SIT STILL: NOT AT ALL
GAD7 TOTAL SCORE: 0
3. WORRYING TOO MUCH ABOUT DIFFERENT THINGS: NOT AT ALL
6. BECOMING EASILY ANNOYED OR IRRITABLE: NOT AT ALL
IF YOU CHECKED OFF ANY PROBLEMS ON THIS QUESTIONNAIRE, HOW DIFFICULT HAVE THESE PROBLEMS MADE IT FOR YOU TO DO YOUR WORK, TAKE CARE OF THINGS AT HOME, OR GET ALONG WITH OTHER PEOPLE: NOT DIFFICULT AT ALL
7. FEELING AFRAID AS IF SOMETHING AWFUL MIGHT HAPPEN: NOT AT ALL
1. FEELING NERVOUS, ANXIOUS, OR ON EDGE: NOT AT ALL
GAD7 TOTAL SCORE: 0
2. NOT BEING ABLE TO STOP OR CONTROL WORRYING: NOT AT ALL

## 2024-04-02 ASSESSMENT — ASTHMA QUESTIONNAIRES: ACT_TOTALSCORE: 23

## 2024-04-02 ASSESSMENT — PATIENT HEALTH QUESTIONNAIRE - PHQ9
SUM OF ALL RESPONSES TO PHQ QUESTIONS 1-9: 0
5. POOR APPETITE OR OVEREATING: NOT AT ALL

## 2024-04-02 NOTE — NURSING NOTE
Chief Complaint   Patient presents with    Recheck Medication     Refill medications, non-fasting today      Pre-visit Screening:  Immunizations:  up to date  Colonoscopy:  is up to date  Mammogram: NA  Asthma Action Test/Plan:  Done today  PHQ9:  Done today  GAD7:  Done today  Questioned patient about current smoking habits Pt. has never smoked.  Ok to leave detailed message on voice mail for today's visit only Yes, phone # [phone]

## 2024-04-02 NOTE — PROGRESS NOTES
Assessment & Plan     1. Moderate persistent asthma, unspecified whether complicated  Stable, continue current medication   - albuterol (PROAIR HFA/PROVENTIL HFA/VENTOLIN HFA) 108 (90 Base) MCG/ACT inhaler; Inhale 1-2 puffs into the lungs every 4 hours as needed for shortness of breath or wheezing  Dispense: 18 g; Refill: 3    2. Generalized anxiety disorder  Stopped ssri on his own and doing well, reviewed s/sx to monitor for and will monitor closely    3. Gastroesophageal reflux disease without esophagitis  Stable, continue current medication   - omeprazole (PRILOSEC) 20 MG DR capsule; Take 1 capsule (20 mg) by mouth daily  Dispense: 90 capsule; Refill: 1    4. Mixed hyperlipidemia  Nonfasting visit today, reviewed healthy lifestyle and will check labs at next visit     Patient Instructions   Follow-up in 6 months, sooner if concerns. FASTING labs at that visit.      Reasons to follow-up sooner or seek emergent care reviewed.       Masood Liriano MD, [facility]      Subjective     Charlie Chacko is a 74 year old male who presents to clinic today for the following health issues:    HPI   Chief Complaint   Patient presents with    Recheck Medication     Refill medications, non-fasting today       Anxiety   How are you doing with your anxiety since your last visit? Doing really well, stopped lexapro since last visit   Are you feeling depressed? No  Do you have any concerns with your use of alcohol or other drugs? No    Social History     Tobacco Use    Smoking status: Never     Passive exposure: Never    Smokeless tobacco: Never   Substance Use Topics    Alcohol use: Yes     Alcohol/week: 0.8 standard drinks of alcohol     Types: 1 Glasses of wine per week    Drug use: No         5/16/2023     2:29 PM 10/11/2023     1:39 PM 4/2/2024     2:00 PM   NYASIA-7 SCORE   Total Score 4 4 0         5/16/2023     2:29 PM 10/11/2023     1:39 PM 4/2/2024     2:00 PM   PHQ   PHQ-9 Total Score 2 0 0   Q9:  "Thoughts of better off dead/self-harm past 2 weeks Not at all Not at all Not at all     Asthma Follow-Up  Was ACT completed today?  Yes        4/2/2024     2:00 PM   ACT Total Scores   ACT TOTAL SCORE (Goal Greater than or Equal to 20) 23   In the past 12 months, how many times did you visit the emergency room for your asthma without being admitted to the hospital? 0   In the past 12 months, how many times were you hospitalized overnight because of your asthma? 0        Recent URI resolving, no concerns   GERD Well controlled with 20mg prilosec nightly. Sx primarily positional, worse when supine at night        Objective    /80 (BP Location: Right arm, Patient Position: Sitting, Cuff Size: Adult Large)   Pulse 77   Temp 98  F (36.7  C) (Temporal)   Ht 1.778 m (5' 10\")   Wt 93 kg (205 lb)   SpO2 97%   BMI 29.41 kg/m    Body mass index is 29.41 kg/m .  Alert, NAD  NC/AT  Sclerae anicteric  RRR  Resp nonlabored CTAB  Skin warm and dry  No focal neuro deficits. Speech intact. Normal gait.  Appropriate affect       Labs reviewed.        "

## 2024-04-02 NOTE — PROGRESS NOTES
Charlie Chacko is a 74 year old male who presents for Medicare Annual Wellness Visit.    Current providers caring for this patient include:  Patient Care Team:  Masood Liriano MD as PCP - General (Family Medicine)  Physicians, [facility] as Assigned PCP    Complete Medical and Social history reviewed with patient, outlined below.    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)    ACP (advance care planning)    Intermittent asthma    GERD (gastroesophageal reflux disease)    Hyperlipidemia    HYPERLIPIDEMIA LDL GOAL <160    Health Care Home    Regular astigmatism, bilateral    Presbyopia       Past Medical History:   Diagnosis Date    TOXIC EFFECT FISH/SHELLFISH     allergy        Past Surgical History:   Procedure Laterality Date    HC REPAIR RECURR INGUIN NIYA,REDUCIBL  1952    Hernia, inguinal    HC VASECTOMY UNILAT/BILAT W POSTOP SEMEN      Vasectomy    ZZC EYE EXAM ESTABLISHED PT  2002       Family History   Problem Relation Age of Onset    Heart Disease Father         MI x 5 but started at 79    Coronary Artery Disease Father     Cancer Maternal Aunt         breast    Diabetes No family hx of     Cancer - colorectal No family hx of     Prostate Cancer No family hx of     Colon Cancer No family hx of     Cerebrovascular Disease No family hx of        Social History     Tobacco Use    Smoking status: Never    Smokeless tobacco: Never   Substance Use Topics    Alcohol use: Yes     Alcohol/week: 0.8 standard drinks of alcohol     Types: 1 Glasses of wine per week       Diet: regular, low salt/low fat  Physical Activity: active without specific exercise program, walks daily  Depression Screen:    Over the past 2 weeks, patient has felt down, depressed, or hopeless:  No    Over the past 2 weeks, patient has felt little interest or pleasure in doing things: No  Functional ability/Safety screen:  Up and go test (able to get up and walk longer than 30 seconds):  "{PASSED/FAILED:875100}  Patient needs assistance with: {IPPE ASSISTANCE:319034::\"nothing\"}  Patient's home has the following possible safety concerns: {IPPE SAFETY CONCERNS:661635::\"none identified\"}  Patient has concerns about his hearing:  {YES/NO :862240::\"Yes\"}  Cognitive Screen  Patient repeats three objects (ball, flag, tree)      Clock drawing test: {patient draws clock, with numbers placed correctly, with hands drawn to signify current time:782001::\"click delete button to remove this line now\"}  {exam normal abnormal, default normal:83005::\"NORMAL\"}  Recalls three objects after 3 minutes (ball,flag,tree):                                                                                               {MENTAL STATUS RECALL:8509080}    Physical Exam:  There were no vitals taken for this visit.   There is no height or weight on file to calculate BMI.   {PROVIDER TO UPDATE PMH, PSH, FamHX, and SocialHX:727805::\"click delete button to remove this line now\"}  {PROVIDER TO CLICK \"Refresh all smart links\" icon - double arrows on R of tool bar:740282::\"click delete button to remove this line now\"}  {Optional Additional Exam - brief:863357}    End of Life Planning:   Patient currently has an advanced directive: { :585375}    Education/Counseling:   Based on review of the above information, the following items were addressed:  {IPPE Counselin}    Appropriate preventive services were discussed with this patient, including applicable screening as appropriate for cardiovascular disease, diabetes, osteopenia/osteoporosis, and glaucoma.  As appropriate for age/gender, discussed screening for colorectal cancer, prostate cancer, breast cancer, and cervical cancer.   Checklist reviewing preventive services available has been given to the patient.    {PROVIDER TO CONSIDER printing smart phrase \"piwelcometomedicareqic\" in AFTER VISIT SUMMARY for patient information :101962::\"click delete button to remove this line " "now\"}    {Please bill a \"NO CHARGE\" so that coders can attach the proper code.  If provider is adding E & M charge beyond above, be sure to add ROS and DIAGNOSIS/PLAN:139792::\"click delete button to remove this line now\"}        "

## 2024-04-03 DIAGNOSIS — F41.1 GENERALIZED ANXIETY DISORDER: ICD-10-CM

## 2024-04-03 RX ORDER — ESCITALOPRAM OXALATE 10 MG/1
10 TABLET ORAL DAILY
COMMUNITY
Start: 2024-04-03

## 2024-04-03 NOTE — TELEPHONE ENCOUNTER
Charlie Chacko is requesting a refill of:    Refused Prescriptions:                       Disp   Refills    escitalopram (LEXAPRO) 10 MG tablet [Pharm*                Sig: TAKE 1 TABLET (10 MG) BY MOUTH DAILY.  Refused By: ANDREINA MELENDEZ  Reason for Refusal: Medication has been discontinued    Pt no longer taking this medication

## 2024-04-25 DIAGNOSIS — J45.40 MODERATE PERSISTENT ASTHMA, UNSPECIFIED WHETHER COMPLICATED: ICD-10-CM

## 2024-04-25 NOTE — TELEPHONE ENCOUNTER
Pt last seen 04/02/24 advised to return in 6 months.    Charlie Chacko is requesting a refill of:    Pending Prescriptions:                       Disp   Refills    BREO ELLIPTA 100-25 MCG/ACT inhaler [Phar*180 ea*1            Sig: TAKE 1 PUFF BY MOUTH EVERY DAY

## 2024-04-26 RX ORDER — FLUTICASONE FUROATE AND VILANTEROL TRIFENATATE 100; 25 UG/1; UG/1
1 POWDER RESPIRATORY (INHALATION) DAILY
Qty: 180 EACH | Refills: 1 | Status: SHIPPED | OUTPATIENT
Start: 2024-04-26 | End: 2024-08-16

## 2024-04-29 DIAGNOSIS — J45.40 MODERATE PERSISTENT ASTHMA, UNSPECIFIED WHETHER COMPLICATED: ICD-10-CM

## 2024-04-30 RX ORDER — FLUTICASONE FUROATE AND VILANTEROL TRIFENATATE 100; 25 UG/1; UG/1
1 POWDER RESPIRATORY (INHALATION) DAILY
COMMUNITY
Start: 2024-04-30

## 2024-04-30 NOTE — TELEPHONE ENCOUNTER
Charlie Chacko is requesting a refill of:    Refused Prescriptions:                       Disp   Refills    fluticasone-vilanterol (BREO ELLIPTA) 100-*                Sig: TAKE 1 PUFF BY MOUTH EVERY DAY  Refused By: TABATHA CHRISTIANSEN  Reason for Refusal: Duplicate    Sent 4/26/24

## 2024-07-10 ENCOUNTER — OFFICE VISIT (OUTPATIENT)
Dept: FAMILY MEDICINE | Facility: CLINIC | Age: 74
End: 2024-07-10

## 2024-07-10 VITALS
OXYGEN SATURATION: 96 % | DIASTOLIC BLOOD PRESSURE: 82 MMHG | WEIGHT: 208 LBS | BODY MASS INDEX: 29.84 KG/M2 | HEART RATE: 75 BPM | SYSTOLIC BLOOD PRESSURE: 138 MMHG | TEMPERATURE: 97.8 F

## 2024-07-10 DIAGNOSIS — Z12.11 SCREENING FOR MALIGNANT NEOPLASM OF COLON: Primary | ICD-10-CM

## 2024-07-10 DIAGNOSIS — F41.1 GENERALIZED ANXIETY DISORDER: ICD-10-CM

## 2024-07-10 PROCEDURE — 99214 OFFICE O/P EST MOD 30 MIN: CPT | Performed by: PHYSICIAN ASSISTANT

## 2024-07-10 PROCEDURE — G2211 COMPLEX E/M VISIT ADD ON: HCPCS | Performed by: PHYSICIAN ASSISTANT

## 2024-07-10 ASSESSMENT — ANXIETY QUESTIONNAIRES
5. BEING SO RESTLESS THAT IT IS HARD TO SIT STILL: MORE THAN HALF THE DAYS
GAD7 TOTAL SCORE: 16
IF YOU CHECKED OFF ANY PROBLEMS ON THIS QUESTIONNAIRE, HOW DIFFICULT HAVE THESE PROBLEMS MADE IT FOR YOU TO DO YOUR WORK, TAKE CARE OF THINGS AT HOME, OR GET ALONG WITH OTHER PEOPLE: SOMEWHAT DIFFICULT
6. BECOMING EASILY ANNOYED OR IRRITABLE: SEVERAL DAYS
3. WORRYING TOO MUCH ABOUT DIFFERENT THINGS: NEARLY EVERY DAY
7. FEELING AFRAID AS IF SOMETHING AWFUL MIGHT HAPPEN: SEVERAL DAYS
2. NOT BEING ABLE TO STOP OR CONTROL WORRYING: NEARLY EVERY DAY
GAD7 TOTAL SCORE: 16
1. FEELING NERVOUS, ANXIOUS, OR ON EDGE: NEARLY EVERY DAY

## 2024-07-10 ASSESSMENT — PATIENT HEALTH QUESTIONNAIRE - PHQ9
SUM OF ALL RESPONSES TO PHQ QUESTIONS 1-9: 10
5. POOR APPETITE OR OVEREATING: NEARLY EVERY DAY

## 2024-07-10 NOTE — PROGRESS NOTES
Assessment & Plan     Screening for malignant neoplasm of colon  Ordered, pt will complete in fall  - Colonoscopy Screening  Referral    Generalized anxiety disorder-pt struggling with this, poor response to Lexapro, will trial sertraline and recheck 6 weeks  Seek emergency care for any SI/HI  - sertraline (ZOLOFT) 50 MG tablet  Dispense: 45 tablet; Refill: 0              Follow up 6 weeks OV    No follow-ups on file.    Subjective   Charlie is a 74 year old, presenting for the following health issues:  Anxiety (Struggling with anxiety, he has batsheva on escitalopram 10mg for the last few years, he stopped this for a short time and did not notice a difference, he is back to takin this and does not feel this is helping )    HPI     Pt was on Lexapro for NYASIA-wasn't helping much so stopped meds ~1 year ago. Restarted 1.5 tablets (15mg) 1 month ago, no real improvement. Noted some lower libido with this issue. More anxious recently-lots of pressure on him to complete a big project on his boat. Excess worry recently. Hasn't been on other SSRIs in the past.         Due for colonoscopy. Ordered.    Review of Systems  Constitutional, neuro, ENT, endocrine, pulmonary, cardiac, gastrointestinal, genitourinary, musculoskeletal, integument and psychiatric systems are negative, except as otherwise noted.      Objective    /82 (BP Location: Right arm, Patient Position: Sitting, Cuff Size: Adult Large)   Pulse 75   Temp 97.8  F (36.6  C) (Temporal)   Wt 94.3 kg (208 lb)   SpO2 96%   BMI 29.84 kg/m    Body mass index is 29.84 kg/m .  Mental Status Exam:   Appearance: calm  Grooming: adequately groomed  Demeanor: engaged, cooperative  Affect: normal  Speech: Normal.  Gait:Normal.  Movements: Normal  Form of thought: Logical, Linear, and Goal directed  Thought content:  Normal  Insight:Good   Judgment: Good   Cognition: Good           Signed Electronically by: Arnold Cantu PA-C

## 2024-08-03 ENCOUNTER — HEALTH MAINTENANCE LETTER (OUTPATIENT)
Age: 74
End: 2024-08-03

## 2024-08-16 ENCOUNTER — OFFICE VISIT (OUTPATIENT)
Dept: FAMILY MEDICINE | Facility: CLINIC | Age: 74
End: 2024-08-16

## 2024-08-16 VITALS
RESPIRATION RATE: 20 BRPM | SYSTOLIC BLOOD PRESSURE: 148 MMHG | BODY MASS INDEX: 28.77 KG/M2 | OXYGEN SATURATION: 96 % | WEIGHT: 201 LBS | HEART RATE: 68 BPM | HEIGHT: 70 IN | DIASTOLIC BLOOD PRESSURE: 80 MMHG | TEMPERATURE: 97.6 F

## 2024-08-16 DIAGNOSIS — Z00.00 ENCOUNTER FOR MEDICARE ANNUAL WELLNESS EXAM: Primary | ICD-10-CM

## 2024-08-16 DIAGNOSIS — N52.9 ERECTILE DYSFUNCTION, UNSPECIFIED ERECTILE DYSFUNCTION TYPE: ICD-10-CM

## 2024-08-16 DIAGNOSIS — F41.1 GENERALIZED ANXIETY DISORDER: ICD-10-CM

## 2024-08-16 DIAGNOSIS — E78.2 MIXED HYPERLIPIDEMIA: ICD-10-CM

## 2024-08-16 DIAGNOSIS — Z12.5 PROSTATE CANCER SCREENING: ICD-10-CM

## 2024-08-16 DIAGNOSIS — K21.9 GASTROESOPHAGEAL REFLUX DISEASE WITHOUT ESOPHAGITIS: ICD-10-CM

## 2024-08-16 DIAGNOSIS — Z13.1 DIABETES MELLITUS SCREENING: ICD-10-CM

## 2024-08-16 DIAGNOSIS — J45.40 MODERATE PERSISTENT ASTHMA, UNSPECIFIED WHETHER COMPLICATED: ICD-10-CM

## 2024-08-16 LAB
BUN SERPL-MCNC: 21 MG/DL (ref 7–25)
BUN/CREATININE RATIO: 17 (ref 6–32)
CALCIUM SERPL-MCNC: 8.9 MG/DL (ref 8.6–10.3)
CHLORIDE SERPLBLD-SCNC: 104.3 MMOL/L (ref 98–110)
CHOLEST SERPL-MCNC: 213 MG/DL (ref 0–199)
CHOLEST/HDLC SERPL: 3 {RATIO} (ref 0–5)
CO2 SERPL-SCNC: 29.4 MMOL/L (ref 20–32)
CREAT SERPL-MCNC: 1.22 MG/DL (ref 0.6–1.3)
GLUCOSE SERPL-MCNC: 93 MG/DL (ref 60–99)
HDLC SERPL-MCNC: 67 MG/DL (ref 40–150)
LDLC SERPL CALC-MCNC: 123 MG/DL
POTASSIUM SERPL-SCNC: 4.93 MMOL/L (ref 3.5–5.3)
SODIUM SERPL-SCNC: 138.2 MMOL/L (ref 135–146)
TRIGL SERPL-MCNC: 117 MG/DL (ref 0–149)

## 2024-08-16 PROCEDURE — G0439 PPPS, SUBSEQ VISIT: HCPCS | Performed by: STUDENT IN AN ORGANIZED HEALTH CARE EDUCATION/TRAINING PROGRAM

## 2024-08-16 PROCEDURE — 99214 OFFICE O/P EST MOD 30 MIN: CPT | Mod: 25 | Performed by: STUDENT IN AN ORGANIZED HEALTH CARE EDUCATION/TRAINING PROGRAM

## 2024-08-16 PROCEDURE — 36415 COLL VENOUS BLD VENIPUNCTURE: CPT | Performed by: STUDENT IN AN ORGANIZED HEALTH CARE EDUCATION/TRAINING PROGRAM

## 2024-08-16 PROCEDURE — 80048 BASIC METABOLIC PNL TOTAL CA: CPT | Performed by: STUDENT IN AN ORGANIZED HEALTH CARE EDUCATION/TRAINING PROGRAM

## 2024-08-16 PROCEDURE — 80061 LIPID PANEL: CPT | Performed by: STUDENT IN AN ORGANIZED HEALTH CARE EDUCATION/TRAINING PROGRAM

## 2024-08-16 RX ORDER — FLUTICASONE FUROATE AND VILANTEROL 100; 25 UG/1; UG/1
1 POWDER RESPIRATORY (INHALATION) DAILY
Qty: 180 EACH | Refills: 1 | Status: SHIPPED | OUTPATIENT
Start: 2024-08-16 | End: 2024-11-07

## 2024-08-16 RX ORDER — SILDENAFIL 100 MG/1
100 TABLET, FILM COATED ORAL DAILY PRN
Qty: 90 TABLET | Refills: 0 | Status: SHIPPED | OUTPATIENT
Start: 2024-08-16

## 2024-08-16 ASSESSMENT — PATIENT HEALTH QUESTIONNAIRE - PHQ9
5. POOR APPETITE OR OVEREATING: NOT AT ALL
SUM OF ALL RESPONSES TO PHQ QUESTIONS 1-9: 0

## 2024-08-16 ASSESSMENT — ANXIETY QUESTIONNAIRES
3. WORRYING TOO MUCH ABOUT DIFFERENT THINGS: NOT AT ALL
1. FEELING NERVOUS, ANXIOUS, OR ON EDGE: SEVERAL DAYS
GAD7 TOTAL SCORE: 2
6. BECOMING EASILY ANNOYED OR IRRITABLE: NOT AT ALL
IF YOU CHECKED OFF ANY PROBLEMS ON THIS QUESTIONNAIRE, HOW DIFFICULT HAVE THESE PROBLEMS MADE IT FOR YOU TO DO YOUR WORK, TAKE CARE OF THINGS AT HOME, OR GET ALONG WITH OTHER PEOPLE: NOT DIFFICULT AT ALL
GAD7 TOTAL SCORE: 2
7. FEELING AFRAID AS IF SOMETHING AWFUL MIGHT HAPPEN: NOT AT ALL
5. BEING SO RESTLESS THAT IT IS HARD TO SIT STILL: NOT AT ALL
2. NOT BEING ABLE TO STOP OR CONTROL WORRYING: SEVERAL DAYS

## 2024-08-16 NOTE — PROGRESS NOTES
Charlie Chacko is a 74 year old male who presents for Medicare Annual Wellness Visit.    Current providers caring for this patient include:  Patient Care Team:  Masood Liriano MD as PCP - General (Family Medicine)  Physicians, [facility] as Assigned PCP    Complete Medical and Social history reviewed with patient, outlined below.    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)    ACP (advance care planning)    Intermittent asthma    GERD (gastroesophageal reflux disease)    Hyperlipidemia    HYPERLIPIDEMIA LDL GOAL <160    Regular astigmatism, bilateral    Presbyopia       Past Medical History:   Diagnosis Date    TOXIC EFFECT FISH/SHELLFISH     allergy        Past Surgical History:   Procedure Laterality Date    HC REPAIR RECURR INGUIN NIYA,REDUCIBL  1952    Hernia, inguinal    HC VASECTOMY UNILAT/BILAT W POSTOP SEMEN      Vasectomy    ZZC EYE EXAM ESTABLISHED PT  2002       Family History   Problem Relation Age of Onset    Heart Disease Father         MI x 5 but started at 79    Coronary Artery Disease Father     Cancer Maternal Aunt         breast    Diabetes No family hx of     Cancer - colorectal No family hx of     Prostate Cancer No family hx of     Colon Cancer No family hx of     Cerebrovascular Disease No family hx of        Social History     Tobacco Use    Smoking status: Never     Passive exposure: Never    Smokeless tobacco: Never   Substance Use Topics    Alcohol use: Yes     Alcohol/week: 0.8 standard drinks of alcohol     Types: 1 Glasses of wine per week       Diet: regular, low salt/low fat-feels that he does well overall and never over indulges, sometimes has to much sugar but is going to watch this   Physical Activity: active without specific exercise program-walks dog everyday   Depression Screen:    Over the past 2 weeks, patient has felt down, depressed, or hopeless:   PHQ9 given today     Over the past 2 weeks, patient has felt little  "interest or pleasure in doing things:  PHQ9 given today     Functional ability/Safety screen:  Up and go test (able to get up and walk longer than 30 seconds): Passed  Patient needs assistance with: nothing-fully independent   Patient's home has the following possible safety concerns: none identified  Patient has concerns about his hearing:  Yes-has hearing aids   Cognitive Screen  Patient repeats three objects (ball, flag, tree)      Clock drawing test:   NORMAL  Recalls three objects after 3 minutes (ball,flag,tree):                                                                                               recalls 2 objects (2 points)    Physical Exam:  BP (!) 148/80 (BP Location: Left arm, Patient Position: Sitting, Cuff Size: Adult Large)   Pulse 68   Temp 97.6  F (36.4  C) (Temporal)   Resp 20   Ht 1.778 m (5' 10\")   Wt 91.2 kg (201 lb)   SpO2 96%   BMI 28.84 kg/m     Body mass index is 28.84 kg/m .         End of Life Planning:   Patient currently has an advanced directive: Yes. Practitioner is supportive of decision. Asked to bring copy to clinic.    Education/Counseling:   Based on review of the above information, the following items were addressed:  Appropriate preventive services were discussed with this patient, including applicable screening as appropriate for cardiovascular disease, diabetes, osteopenia/osteoporosis, and glaucoma.  As appropriate for age/gender, discussed screening for colorectal cancer, prostate cancer, breast cancer, and cervical cancer.   Checklist reviewing preventive services available has been given to the patient.        Masood Liriano MD, [facility]           "

## 2024-08-16 NOTE — NURSING NOTE
Chief Complaint   Patient presents with    Recheck Medication     6 week follow up medication check and review for sertraline, feels that he is having less anxiety but he has changed some lifestyle things and is not sure if decreased anxiety is from the medication or his life changes, no negative side effects reported     Medicare Visit     Annual medicare wellness visit      Pre-visit Screening:  Immunizations:  up to date  Colonoscopy:  is due and to be scheduled by patient for later completion-patient has this scheduled for November 2024  Mammogram: na  Asthma Action Test/Plan:  na  PHQ9:  given today   GAD7:  given today   Questioned patient about current smoking habits Pt. has never smoked.  Ok to leave detailed message on voice mail for today's visit only yes, phone # [phone] (home)

## 2024-08-16 NOTE — PATIENT INSTRUCTIONS
Blood work today    Taper off sertraline     Can consider working with psychologist    No other medication changes    Follow-up in 6 months, sooner if concerns

## 2024-08-16 NOTE — TELEPHONE ENCOUNTER
Charlie Chacko is requesting a refill of:    Refused Prescriptions:                       Disp   Refills    sertraline (ZOLOFT) 50 MG tablet [Pharmacy*                Sig: TAKE 1 TABLET BY MOUTH EVERY DAY  Refused By: ANDREINA MELENDEZ  Reason for Refusal: Medication has been discontinued

## 2024-08-16 NOTE — PROGRESS NOTES
Assessment & Plan     Anxiety  Significantly improved and having SSRI related sexual side effects. Agreed to taper off sertraline over 2-3 weeks, follow-up with any concerns. Could consider establishing with psychologist in future if needed.     1. Erectile dysfunction, unspecified erectile dysfunction type  Taper ssri as above, tolerates viagra well and will continue current dose   - sildenafil (VIAGRA) 100 MG tablet; Take 1 tablet (100 mg) by mouth daily as needed (For ED)  Dispense: 90 tablet; Refill: 0    2. Gastroesophageal reflux disease without esophagitis  Stable, continue current medication   - omeprazole (PRILOSEC) 20 MG DR capsule; Take 1 capsule (20 mg) by mouth daily  Dispense: 90 capsule; Refill: 1    3. Moderate persistent asthma, unspecified whether complicated  Stable, continue current medication   - fluticasone-vilanterol (BREO ELLIPTA) 100-25 MCG/ACT inhaler; Inhale 1 puff into the lungs daily  Dispense: 180 each; Refill: 1    6. Mixed hyperlipidemia  Recheck labs, declines statin for now  - Lipid Panel ([facility])    4. Encounter for Medicare annual wellness exam  - separate note  5. Diabetes mellitus screening  - Basic Metabolic Panel ([facility])  7. Prostate cancer screening  - PSA Total ([facility])     Patient Instructions   Blood work today    Taper off sertraline     Can consider working with psychologist    No other medication changes    Follow-up in 6 months, sooner if concerns     Reasons to follow-up sooner or seek emergent care reviewed.     Masood Liriano MD, [facility]      Subjective     Charlie Chacko is a 74 year old male who presents to clinic today for the following health issues:    HPI   Chief Complaint   Patient presents with    Recheck Medication     6 week follow up medication check and review for sertraline, feels that he is having less anxiety but he has changed some lifestyle things and is not sure if decreased anxiety is from the medication or his life  "changes, no negative side effects reported     Medicare Visit     Annual medicare wellness visit       Anxiety   How are you doing with your anxiety since your last visit? Doing better, tolerating sertraline well except for sexual side effects (also bothersome on lexapro, resolved when dcd). Hasn't worked with psychologist  Are you having other symptoms that might be associated with anxiety? No  Are you feeling depressed? No  Do you have any concerns with your use of alcohol or other drugs? No    Social History     Tobacco Use    Smoking status: Never     Passive exposure: Never    Smokeless tobacco: Never   Substance Use Topics    Alcohol use: Yes     Alcohol/week: 0.8 standard drinks of alcohol     Types: 1 Glasses of wine per week    Drug use: No         4/2/2024     2:00 PM 7/10/2024    11:57 AM 8/16/2024    11:16 AM   NYASIA-7 SCORE   Total Score 0 16 2         4/2/2024     2:00 PM 7/10/2024    11:57 AM 8/16/2024    11:16 AM   PHQ   PHQ-9 Total Score 0 10 0   Q9: Thoughts of better off dead/self-harm past 2 weeks Not at all Not at all Not at all     GERD: Well controlled with 20mg prilosec nightly. Sx primarily positional, worse when supine at night. No swallowing issues. Has tried to taper off multiple times without success.     Hyperlipidemia Follow-Up    Are you regularly taking any medication or supplement to lower your cholesterol?   No previous statin intolerance    Asthma Follow-Up  Very well controlled, no ER visits or exacerbations.         Objective    BP (!) 148/80 (BP Location: Left arm, Patient Position: Sitting, Cuff Size: Adult Large)   Pulse 68   Temp 97.6  F (36.4  C) (Temporal)   Resp 20   Ht 1.778 m (5' 10\")   Wt 91.2 kg (201 lb)   SpO2 96%   BMI 28.84 kg/m    Body mass index is 28.84 kg/m .  Alert, NAD  NC/AT  Sclerae anicteric  RRR  Resp nonlabored CTAB  Skin warm and dry  No focal neuro deficits. Speech intact. Normal gait.  Appropriate affect       Labs reviewed.        "

## 2024-08-17 LAB — ABBOTT PSA - QUEST: 1.83 NG/ML

## 2024-11-04 ENCOUNTER — TRANSFERRED RECORDS (OUTPATIENT)
Dept: FAMILY MEDICINE | Facility: CLINIC | Age: 74
End: 2024-11-04

## 2024-11-07 DIAGNOSIS — J45.40 MODERATE PERSISTENT ASTHMA, UNSPECIFIED WHETHER COMPLICATED: ICD-10-CM

## 2024-11-07 NOTE — TELEPHONE ENCOUNTER
Pt's inhaler was sent to [facility], he needs this to [facility]. Can you resend?    Charlie Chacko is requesting a refill of:    Pending Prescriptions:                       Disp   Refills    fluticasone-vilanterol (BREO ELLIPTA) 100*180 ea*1            Sig: Inhale 1 puff into the lungs daily.

## 2024-11-08 ENCOUNTER — TELEPHONE (OUTPATIENT)
Dept: FAMILY MEDICINE | Facility: CLINIC | Age: 74
End: 2024-11-08

## 2024-11-08 DIAGNOSIS — J45.40 MODERATE PERSISTENT ASTHMA, UNSPECIFIED WHETHER COMPLICATED: Primary | ICD-10-CM

## 2024-11-08 RX ORDER — FLUTICASONE FUROATE AND VILANTEROL 100; 25 UG/1; UG/1
1 POWDER RESPIRATORY (INHALATION) DAILY
Qty: 180 EACH | Refills: 1 | Status: SHIPPED | OUTPATIENT
Start: 2024-11-08 | End: 2024-11-12

## 2024-11-12 RX ORDER — FLUTICASONE FUROATE AND VILANTEROL TRIFENATATE 100; 25 UG/1; UG/1
1 POWDER RESPIRATORY (INHALATION) DAILY
Qty: 180 EACH | Refills: 3 | Status: SHIPPED | OUTPATIENT
Start: 2024-11-12

## 2024-11-12 NOTE — TELEPHONE ENCOUNTER
Charlie Chacko is requesting a refill of:    Pending Prescriptions:                       Disp   Refills    BREO ELLIPTA 100-25 MCG/ACT inhaler       180 ea*3            Sig: Inhale 1 puff into the lungs daily.    Needs to be name brand, generic not covered

## 2024-12-04 ENCOUNTER — OFFICE VISIT (OUTPATIENT)
Dept: FAMILY MEDICINE | Facility: CLINIC | Age: 74
End: 2024-12-04

## 2024-12-04 VITALS
OXYGEN SATURATION: 97 % | BODY MASS INDEX: 30.84 KG/M2 | HEART RATE: 75 BPM | WEIGHT: 208.2 LBS | HEIGHT: 69 IN | TEMPERATURE: 98.1 F | DIASTOLIC BLOOD PRESSURE: 73 MMHG | SYSTOLIC BLOOD PRESSURE: 129 MMHG

## 2024-12-04 DIAGNOSIS — J45.40 MODERATE PERSISTENT ASTHMA, UNSPECIFIED WHETHER COMPLICATED: ICD-10-CM

## 2024-12-04 DIAGNOSIS — Z86.0100 HISTORY OF COLONIC POLYPS: ICD-10-CM

## 2024-12-04 DIAGNOSIS — K21.9 GASTROESOPHAGEAL REFLUX DISEASE WITHOUT ESOPHAGITIS: ICD-10-CM

## 2024-12-04 DIAGNOSIS — R03.0 ELEVATED BP WITHOUT DIAGNOSIS OF HYPERTENSION: Primary | ICD-10-CM

## 2024-12-04 DIAGNOSIS — K57.30 DIVERTICULOSIS OF LARGE INTESTINE WITHOUT HEMORRHAGE: ICD-10-CM

## 2024-12-04 DIAGNOSIS — F41.1 GENERALIZED ANXIETY DISORDER: ICD-10-CM

## 2024-12-04 NOTE — PROGRESS NOTES
Assessment & Plan     1. Elevated BP without diagnosis of hypertension (Primary)  Initial reading today 152/104, follow-up 7 min avg 129/73. Will restart home monitoring.     2. Moderate persistent asthma, unspecified whether complicated  Well controlled, continue current inhalers    3. History of colonic polyps  4. Diverticulosis of large intestine without hemorrhage  Reviewed colonoscopy, pt asx.     5. Generalized anxiety disorder  Doing well off SSRI, reviewed resources available     Patient Instructions   Restart home blood pressuring monitoring, goal to be consistently < 135/85     No change to medications     Can consider testosterone levels at next visit - best if done early morning    Colonoscopy due again in 5 years    Enjoy your trip!     Please follow-up with me in 3-4 months, sooner if needed      Reasons to follow-up sooner or seek emergent care reviewed.       Masood Liriano MD, [facility]      Subjective     Charlie Chacko is a 74 year old male who presents to clinic today for the following health issues:    HPI   Chief Complaint   Patient presents with    Recheck Medication     Pt is here for fasting med check   Pt b/p is high today       BP elevated at blood donation center - had to wait 30 min for BP to decline to normal range before being allowed to donate. Has home BP cuff but hasn't used in a long time. No headache, CP, acute vision changes.     Anxiety: tapered off sertraline, no recurrent sx and sexual side effects have improved    Asthma Follow-Up  No recent URI triggers, well controlled, no ER visits or exacerbations.   Was ACT completed today?  Not today      4/2/2024     2:00 PM   ACT Total Scores   ACT TOTAL SCORE (Goal Greater than or Equal to 20) 23   In the past 12 months, how many times did you visit the emergency room for your asthma without being admitted to the hospital? 0   In the past 12 months, how many times were you hospitalized overnight because of  "your asthma? 0              Objective    /73 (BP Location: Right arm, Patient Position: Sitting, Cuff Size: Adult Large)   Pulse 75   Temp 98.1  F (36.7  C) (Oral)   Ht 1.753 m (5' 9\")   Wt 94.4 kg (208 lb 3.2 oz)   SpO2 97%   BMI 30.75 kg/m    Body mass index is 30.75 kg/m .  Alert, NAD  NC/AT  Sclerae anicteric  RRR no m no edema  Resp nonlabored CTAB  Skin warm and dry  No focal neuro deficits. Speech intact. Normal gait.  Appropriate affect       Labs reviewed.        " yes

## 2024-12-04 NOTE — PATIENT INSTRUCTIONS
Restart home blood pressuring monitoring, goal to be consistently < 135/85     No change to medications     Can consider testosterone levels at next visit - best if done early morning    Colonoscopy due again in 5 years    Enjoy your trip!     Please follow-up with me in 3-4 months, sooner if needed

## 2024-12-04 NOTE — NURSING NOTE
Chief Complaint   Patient presents with    Recheck Medication     Pt is here for fasting med check   Pt b/p is high today      Pre-visit Screening:  Immunizations:  up to date  Colonoscopy:  is up to date  Mammogram: na  Asthma Action Test/Plan:  filled out today   PHQ9:  na  GAD7:  na  Questioned patient about current smoking habits Pt. has never smoked.  Ok to leave detailed message on voice mail for today's visit only yes , phone # [phone]

## 2025-01-21 ENCOUNTER — OFFICE VISIT (OUTPATIENT)
Dept: FAMILY MEDICINE | Facility: CLINIC | Age: 75
End: 2025-01-21

## 2025-01-21 VITALS
SYSTOLIC BLOOD PRESSURE: 144 MMHG | HEART RATE: 68 BPM | WEIGHT: 216 LBS | TEMPERATURE: 98.1 F | BODY MASS INDEX: 31.9 KG/M2 | OXYGEN SATURATION: 92 % | DIASTOLIC BLOOD PRESSURE: 82 MMHG

## 2025-01-21 DIAGNOSIS — R05.8 OTHER SPECIFIED COUGH: Primary | ICD-10-CM

## 2025-01-21 DIAGNOSIS — Z20.822 EXPOSURE TO 2019 NOVEL CORONAVIRUS: ICD-10-CM

## 2025-01-21 DIAGNOSIS — J18.9 PNEUMONIA DUE TO INFECTIOUS ORGANISM, UNSPECIFIED LATERALITY, UNSPECIFIED PART OF LUNG: ICD-10-CM

## 2025-01-21 LAB
COVID-19: NEGATIVE
FLUAV AG UPPER RESP QL IA.RAPID: NORMAL
FLUBV AG UPPER RESP QL IA.RAPID: NORMAL

## 2025-01-21 RX ORDER — BENZONATATE 100 MG/1
100 CAPSULE ORAL 3 TIMES DAILY PRN
Qty: 20 CAPSULE | Refills: 0 | Status: SHIPPED | OUTPATIENT
Start: 2025-01-21

## 2025-01-21 RX ORDER — AZITHROMYCIN 250 MG/1
TABLET, FILM COATED ORAL
Qty: 6 TABLET | Refills: 0 | Status: SHIPPED | OUTPATIENT
Start: 2025-01-21 | End: 2025-01-21

## 2025-01-21 NOTE — NURSING NOTE
Chief Complaint   Patient presents with    URI     Symptoms started 10 days ago with fever and sore throat, cough, wheezing/rattling in chest, SOB, dyspnea on exertion, lethargic, productive cough     Pre-visit Screening:  Immunizations:  up to date  Colonoscopy:  is up to date  Mammogram: NA  Asthma Action Test/Plan:  NA  PHQ9:  NA  GAD7:  NA  Questioned patient about current smoking habits Pt. has never smoked.  Ok to leave detailed message on voice mail for today's visit only Yes, phone # [phone]

## 2025-01-21 NOTE — PROGRESS NOTES
"Assessment & Plan   Problem List Items Addressed This Visit    None  Visit Diagnoses       Other specified cough    -  Primary    Relevant Medications    amoxicillin-clavulanate (AUGMENTIN) 875-125 MG tablet    benzonatate (TESSALON) 100 MG capsule    Other Relevant Orders    COVID-19 ([facility]) (Completed)    Influenza A and B ([facility]) (Completed)    XR Chest 2 Views    Exposure to 2019 novel coronavirus               1. Other specified cough (Primary)  Negative testing, but he has had symptoms now for 10 days, wife with covid and similar symptoms, so this may be covid. He is past the treatment window, concern for pneumonia, chest xray done, will treat with augmentin, his usual inhalers and tessalon for cough. If any changes or worsening shortness of breath, should be seen here or in the ER.  - COVID-19 ([facility])  - Influenza A and B ([facility])  - XR Chest 2 Views  - amoxicillin-clavulanate (AUGMENTIN) 875-125 MG tablet; Take 1 tablet by mouth 2 times daily for 10 days.  Dispense: 20 tablet; Refill: 0  - benzonatate (TESSALON) 100 MG capsule; Take 1 capsule (100 mg) by mouth 3 times daily as needed.  Dispense: 20 capsule; Refill: 0    2. Exposure to 2019 novel coronavirus         3. Pneumonia due to infectious organism, unspecified laterality, unspecified part of lung  Treat with antibiotics and recheck again in 6 weeks, clinic apt          BMI  Estimated body mass index is 31.9 kg/m  as calculated from the following:    Height as of 12/4/24: 1.753 m (5' 9\").    Weight as of this encounter: 98 kg (216 lb).         FUTURE APPOINTMENTS:       - Follow-up visit as needed. We manage his chronic medical care.    No follow-ups on file.    Vivien Nguyen MD  [facility]    Subjective     Nursing Notes:   Charline Perez CMA  1/21/2025 10:23 AM  Signed  Chief Complaint   Patient presents with    URI     Symptoms started 10 days ago with fever and sore throat, cough, wheezing/rattling in chest, SOB, dyspnea on exertion, " lethargic, productive cough     Pre-visit Screening:  Immunizations:  up to date  Colonoscopy:  is up to date  Mammogram: NA  Asthma Action Test/Plan:  NA  PHQ9:  NA  GAD7:  NA  Questioned patient about current smoking habits Pt. has never smoked.  Ok to leave detailed message on voice mail for today's visit only Yes, phone # [phone]       Charlie Chacko is a 74 year old male who presents to clinic today for the following health issues   HPI     Here with wife, similar sx.  Was on a cruise, uri sx started with dry scratchy throat, little temp, then went to the lungs. Cough. Had to sit down and rest, deep cough. Productive. No fevers now.   Didn't see anyone on the cruise for this. Now it seems to be zapping all his energy.        Review of Systems   Constitutional, HEENT, cardiovascular, pulmonary, gi and gu systems are negative, except as otherwise noted.      Objective    BP (!) 144/82 (BP Location: Right arm, Patient Position: Sitting, Cuff Size: Adult Large)   Pulse 68   Temp 98.1  F (36.7  C) (Temporal)   Wt 98 kg (216 lb)   SpO2 92%   BMI 31.90 kg/m    Body mass index is 31.9 kg/m .  Physical Exam   GENERAL: alert and no distress  RESP: lungs clear to auscultation except scattered wheeze both lung fields.  CV: regular rate and rhythm, normal S1 S2, no S3 or S4, no murmur, click or rub, no peripheral edema  MS: no gross musculoskeletal defects noted, no edema  PSYCH: mentation appears normal, affect normal/bright    Results for orders placed or performed in visit on 01/21/25   COVID-19 ([facility])     Status: None   Result Value Ref Range    COVID-19 Negative    Influenza A and B ([facility])     Status: None   Result Value Ref Range    Influenza A neg neg    Influenza B neg neg       Chest xray pa and lateral, will be sent for overread, pneumonia

## 2025-01-28 ENCOUNTER — TRANSFERRED RECORDS (OUTPATIENT)
Dept: FAMILY MEDICINE | Facility: CLINIC | Age: 75
End: 2025-01-28

## 2025-02-06 DIAGNOSIS — K21.9 GASTROESOPHAGEAL REFLUX DISEASE WITHOUT ESOPHAGITIS: ICD-10-CM

## 2025-02-06 DIAGNOSIS — J45.40 MODERATE PERSISTENT ASTHMA, UNSPECIFIED WHETHER COMPLICATED: ICD-10-CM

## 2025-02-06 RX ORDER — OMEPRAZOLE 20 MG/1
20 CAPSULE, DELAYED RELEASE ORAL DAILY
COMMUNITY
Start: 2025-02-06

## 2025-02-06 RX ORDER — FLUTICASONE FUROATE AND VILANTEROL TRIFENATATE 100; 25 UG/1; UG/1
POWDER RESPIRATORY (INHALATION)
COMMUNITY
Start: 2025-02-06

## 2025-02-06 NOTE — TELEPHONE ENCOUNTER
Received incoming refill request for  Pending Prescriptions:                       Disp   Refills    BREO ELLIPTA 100-25 MCG/ACT inhaler [Phar*180 ea*0            Sig: INHALE ONE PUFF BY MOUTH ONE TIME DAILY    omeprazole (PRILOSEC) 20 MG DR capsule [P*90 cap*0            Sig: TAKE ONE CAPSULE BY MOUTH ONE TIME DAILY    Refills are at pharmacy.

## 2025-05-03 ENCOUNTER — HEALTH MAINTENANCE LETTER (OUTPATIENT)
Age: 75
End: 2025-05-03

## 2025-08-08 DIAGNOSIS — K21.9 GASTROESOPHAGEAL REFLUX DISEASE WITHOUT ESOPHAGITIS: ICD-10-CM

## 2025-08-12 RX ORDER — OMEPRAZOLE 20 MG/1
20 CAPSULE, DELAYED RELEASE ORAL DAILY
Qty: 30 CAPSULE | Refills: 0 | Status: SHIPPED | OUTPATIENT
Start: 2025-08-12

## 2025-09-04 DIAGNOSIS — K21.9 GASTROESOPHAGEAL REFLUX DISEASE WITHOUT ESOPHAGITIS: ICD-10-CM

## 2025-09-04 RX ORDER — OMEPRAZOLE 20 MG/1
20 CAPSULE, DELAYED RELEASE ORAL DAILY
COMMUNITY
Start: 2025-09-04